# Patient Record
Sex: FEMALE | Race: WHITE | Employment: FULL TIME | URBAN - METROPOLITAN AREA
[De-identification: names, ages, dates, MRNs, and addresses within clinical notes are randomized per-mention and may not be internally consistent; named-entity substitution may affect disease eponyms.]

---

## 2021-04-29 ENCOUNTER — OFFICE VISIT (OUTPATIENT)
Dept: BARIATRICS/WEIGHT MGMT | Age: 31
End: 2021-04-29
Payer: MEDICARE

## 2021-04-29 VITALS
WEIGHT: 293 LBS | DIASTOLIC BLOOD PRESSURE: 86 MMHG | HEIGHT: 66 IN | HEART RATE: 76 BPM | BODY MASS INDEX: 47.09 KG/M2 | SYSTOLIC BLOOD PRESSURE: 114 MMHG

## 2021-04-29 DIAGNOSIS — G47.33 OSA (OBSTRUCTIVE SLEEP APNEA): ICD-10-CM

## 2021-04-29 DIAGNOSIS — E66.01 MORBID OBESITY WITH BMI OF 45.0-49.9, ADULT (HCC): ICD-10-CM

## 2021-04-29 DIAGNOSIS — E28.2 PCOS (POLYCYSTIC OVARIAN SYNDROME): ICD-10-CM

## 2021-04-29 DIAGNOSIS — K21.9 GASTROESOPHAGEAL REFLUX DISEASE WITHOUT ESOPHAGITIS: Primary | ICD-10-CM

## 2021-04-29 PROCEDURE — 99214 OFFICE O/P EST MOD 30 MIN: CPT | Performed by: SURGERY

## 2021-04-29 PROCEDURE — 1036F TOBACCO NON-USER: CPT | Performed by: SURGERY

## 2021-04-29 PROCEDURE — G8427 DOCREV CUR MEDS BY ELIG CLIN: HCPCS | Performed by: SURGERY

## 2021-04-29 PROCEDURE — G8417 CALC BMI ABV UP PARAM F/U: HCPCS | Performed by: SURGERY

## 2021-04-29 RX ORDER — SUCRALFATE 1 G/1
TABLET ORAL
COMMUNITY
Start: 2021-04-01 | End: 2021-08-31

## 2021-04-29 RX ORDER — FLUOXETINE HYDROCHLORIDE 20 MG/1
CAPSULE ORAL
COMMUNITY
Start: 2021-01-22

## 2021-05-02 NOTE — PROGRESS NOTES
[facility] INVASIVE BARIATRIC SURG  [address] [place] [address] [address] [address]  Dept: [phone]    SURGICAL WEIGHT MANAGEMENT PROGRAM  PROGRESS NOTE INITIAL EVALUATION     Patient: Darrick Meol        Service Date: 4/29/2021      HPI:     Chief Complaint   Patient presents with    Bariatric, Initial Visit     discuss bypass /revision last saw in 2016    Weight Loss       The patient is a pleasant 32y.o. year old female  with morbid obesity, who stands Height: 5' 6\" (167.6 cm) tall with a weight of Weight: 297 lb (134.7 kg) , resulting in a BMI of Body mass index is 47.94 kg/m². . The patient suffers from multiple co-morbidities as a result of morbid obesity, including: Asthma, Obstructive Sleep Apnea treated with BiPAP/CPAP, GERD and Polycystic Ovarian Syndrome. She has suffered from obesity for many years. She had prior sleeve with Dr. Ruba Macedo and has had weight regain and GERD. She would like to consider a revision    The patient denies  a history of myocardial infarction, deep vein thrombosis, pulmonary embolism, renal failure, hepatic failure and stroke. The patient has failed multiple attempts at non-surgical weight loss, and is now seeking surgical intervention to promote permanent and consistent weight loss. She  has chosen Roderick-en-Y Gastric Bypass and Revisional Surgery. She is well educated regarding it, as she has recently viewed our weight loss surgery informational seminar .      Medical History:  Past Medical History:   Diagnosis Date    Cholelithiasis     GERD (gastroesophageal reflux disease)     History of asthma     sports induced    Migraine     Obesity     PCOS (polycystic ovarian syndrome)     PONV (postoperative nausea and vomiting)     S/P cholecystectomy 2-29-16    [facility], Dr. Andressa Joyce S/P laparoscopic sleeve gastrectomy 1-5-15    start wt = 308.6lb, Dr. Ruba Macedo, [facility]    Sleep apnea     cpap       Surgical History:  Past medications for this visit. No Known Allergies    SOCIAL:      This patient is alone for the evaluation today. [] HIV Risk Factors (i.e.) intravenous drug abuser; at risk sexual behavior; received blood products    [] TB Risk Factors (i.e.) Medically underserved, institutional care, foreign born, endemic area; exposure to active case    [] Hepatitis B&C Risk Factors (i.e.) Received blood transfusion prior to 1992; recreational drug use; high risk sexual behaviors; tattoos or body piercings; contact with blood or needle sticks in the workplace    Comprehension    Ability to grasp concepts and respond to questions:   [x] High   [] Medium   [] Low    Motivation    [x] Asks Questions; eager to learn   [] Needs education   [] Extreme anxiety    [] uncooperative   [] Denies need for education    English Speaking Ability    [x] Speaks English well   [x] Reads English well   [] Understands spoken Vane Pump    [x] Understands written English   [x] No need for interpretive support      [] Might benefit from interpretive support   []  required for all services     REVIEW OF SYSTEMS: (Negative unless marked otherwise)       Do you or have you had any of the following?   Cardiovascular YES NO Respiratory YES NO   High Blood Pressure   []   [] COPD   []   []   Heart Attack   []   [] TB/Positive skin Test   []   []   Congestive Heart Failure   []   [] Obstructive Sleep Apnea   []   []   Coronary Artery Disease   []   [] Asthma   []   []   Circulation Problems   []   []      Activity Intolerance   []   [] Gastrointestinal YES NO   Peripheral Vascular Disease   []   [] Gastric Problems   []   []        Colorectal problems   []   []   Hematological YES NO Ulcer disease   []   []   Bleeding Tendencies   []   [] Liver disease   []   []   Blood Transfusion last 30d   []   [] Gallstones   [x]   []   Anemia   []   [] Refulx or Heartburn   [x]   []   Blood Clots   []   []      High Cholesterol   []   [] Muscoloskeletal YES NO   High Triglycerides   []   [] Joint Limitations   []   []      Muscle Weakness   []   []   Eyes, Ears, Nose, Throat YES NO Multiple Sclerosis   []   []   Cataracts   []   [] Arthritis   []   []   Glasses   []   []      Blurred Vision   []   [] Cancer   []   []   Hearing Aids   []   [] Type:     Ringing in Ears   []   []      Difficulty Swallowing   []   [] Encodrine YES NO      Diabetes   []   []   Neurological YES NO Thyroid   []   []   Stroke   []   []      Seizure   []   [] Psychiatric Disorder YES NO   Dizziness/Blackouts/Fainting   []   [] Depression   []   []   Memory Impairement   []   [] Bipolar   []   []   Parkinson's   []   [] Anxiety disorder   []   []           Genitourinary/Gyn YES NO Skin Intact   [x]   []   Urinary Infection   []   []      Stones   []   [] Sleep   YES NO   Kidney Disease   []   [] Excessive daytime sleepiness   []   []   Incontinent   []   [] Snoring   []   []   Irregular menstrual cycles   []   [] Unrefreshed sleep   []   []   Possibly Pregnant? []     [] Other:      Date of LMP:   Preferred location:            PRESENT ILLNESS:     Weight Parameters  Weight 297 lb (134.7 kg)   Height 5' 6\" (1.676 m)   BMI Body mass index is 47.94 kg/m². IBW     EBW                 FALLS ASSESSMENT    [x] LOW RISK FOR FALLS    [x] MODERATE RISK FOR FALLS    [] Difficulty walking/selfcare    [] Falls in the past 2 months    [] Suspicion of Clinician    [] Other:      SMOKING CESSATION     [x] Not needed     [] Instructed to stop smoking    [] Pamphlet community resources given     VTE SCREEN    [] Family hx DVT/PE  /   [] Personal hx of DVT/PE    [x] Denies any family or personal hx of DVT/PE    Physician Review    [x] Past medical, family, & social history reviewed and discussed with patient.      Review of surgery and post-surgical changes (by surgeon for surgical patients only)    [x] Lifelong diet expectations reviewed with patient    [x] Need for lifelong vitamin supplementation vitamin supplementation, as well as routine scheduled and dedicated exercise. I instructed the patient to utilize the exercise log that will be given to them at their fist dietician appointment. We discussed the potential weight loss benefit of approximately 60-70% of her excess body weight at 12-18 months post-op, as well as the possibility of insufficient weight loss or weight gain after 2 years post-operative time    PLAN:       Diagnosis Orders   1. Gastroesophageal reflux disease without esophagitis     2. PCOS (polycystic ovarian syndrome)     3. KATELYNN (obstructive sleep apnea)     4. Morbid obesity with BMI of 45.0-49.9, adult [facility])            Initial Testing     Primary Procedure: Roderick-en-Y Gastric Bypass and Revisional Surgery     Other Procedures:None    Labwork: Initial Pre-surgical Lab Tests (CMP, TSH, Fasting Lipid Profile, Mg, Zinc, Vit B1 (whole blood), Vit B12, 25-OH Vit D, Fe,  Ferritin,  Folate) and Negative serum nicotine prior to submission for pre-auth    Imaging: None    Endoscopic Studies: Upper GI Endoscopy for GERD which has been untreated. Need records    Psychological Assessment: Psychological Evaluation and Clearance to rule out eating disorder    Nutrition Assessment: Bariatric Nutrition Assessment and Clearance    Other  Consultations: medical clearance    Physician Supervised Diet and Exercise required by the patients insurance company: 3 months.       Surgical Diet requirement:  2 weeks    Final Testing  Screening Chest Xray  and EKG within 6 months of date of surgery    Labwork:  Final Lab Tests  within 3 months of date of surgery (CBC, PT/PTT, BMP)     Electronically signed by Ron Chaves DO on 5/2/2021 at 7:30 PM

## 2021-05-12 DIAGNOSIS — K21.9 GASTROESOPHAGEAL REFLUX DISEASE WITHOUT ESOPHAGITIS: Primary | ICD-10-CM

## 2021-05-20 ENCOUNTER — OFFICE VISIT (OUTPATIENT)
Dept: BARIATRICS/WEIGHT MGMT | Age: 31
End: 2021-05-20
Payer: MEDICARE

## 2021-05-20 VITALS
HEART RATE: 82 BPM | SYSTOLIC BLOOD PRESSURE: 110 MMHG | DIASTOLIC BLOOD PRESSURE: 78 MMHG | HEIGHT: 66 IN | BODY MASS INDEX: 47.09 KG/M2 | WEIGHT: 293 LBS

## 2021-05-20 DIAGNOSIS — K21.9 GASTROESOPHAGEAL REFLUX DISEASE, UNSPECIFIED WHETHER ESOPHAGITIS PRESENT: ICD-10-CM

## 2021-05-20 DIAGNOSIS — Z98.84 S/P LAPAROSCOPIC SLEEVE GASTRECTOMY: ICD-10-CM

## 2021-05-20 DIAGNOSIS — D50.9 IRON DEFICIENCY ANEMIA, UNSPECIFIED IRON DEFICIENCY ANEMIA TYPE: ICD-10-CM

## 2021-05-20 DIAGNOSIS — E28.2 PCOS (POLYCYSTIC OVARIAN SYNDROME): Primary | ICD-10-CM

## 2021-05-20 DIAGNOSIS — E66.01 MORBID OBESITY (HCC): ICD-10-CM

## 2021-05-20 PROCEDURE — G8417 CALC BMI ABV UP PARAM F/U: HCPCS | Performed by: NURSE PRACTITIONER

## 2021-05-20 PROCEDURE — 1036F TOBACCO NON-USER: CPT | Performed by: NURSE PRACTITIONER

## 2021-05-20 PROCEDURE — 99213 OFFICE O/P EST LOW 20 MIN: CPT | Performed by: NURSE PRACTITIONER

## 2021-05-20 PROCEDURE — G8427 DOCREV CUR MEDS BY ELIG CLIN: HCPCS | Performed by: NURSE PRACTITIONER

## 2021-05-20 NOTE — PROGRESS NOTES
Medical Weight Management Progress Note    Subjective      Patient being seen for medically supervised weight loss for the chronic conditions of PCOS, BLANCO, GERD. She had prior sleeve gastrectomy with Dr. Kleber Hernandez in 2015 and has had weight regain and GERD. She would like to consider a revision  She is working on the behavior changes discussed at the initial appointment. Patient continues on diet plan. Physical activity includes walking. Weight loss since last visit is 0 lbs. Psych eval scheduled 6/24/21. EGD done at [facility] 4/15/21 reviewed per Dr Kleber Vazquez. No path report available. Requests H Pylori breath test.  Patient taking protonix and carafate. No current issues. Working toward bariatric surgery:    [] Sleeve Gastrectomy                                                           [] Roderick-en-Y Gastric Bypass                [x] Revision of Sleeve to Gastric Bypass    Allergies:  No Known Allergies    Past Medical History:     Past Medical History:   Diagnosis Date    Cholelithiasis     GERD (gastroesophageal reflux disease)     History of asthma     sports induced    Migraine     Obesity     PCOS (polycystic ovarian syndrome)     PONV (postoperative nausea and vomiting)     S/P cholecystectomy 2-29-16    [facility], Dr. Susanna Figueroa S/P laparoscopic sleeve gastrectomy 1-5-15    start wt = 308.6lb, Dr. Sanford Seek    Sleep apnea     cpap   . Past Surgical History:  Past Surgical History:   Procedure Laterality Date    CHOLECYSTECTOMY, LAPAROSCOPIC  02/29/16    X I robotic assisted    GASTRECTOMY  1/5/15    sleeve gastrectomy    HERNIA REPAIR      with sleeve surgery.         Family History:  Family History   Problem Relation Age of Onset    Substance Abuse Mother     Depression Father     Diabetes Father     High Cholesterol Father     Stroke Father     Arthritis Maternal Grandmother     Prostate Cancer Maternal Grandfather     Stroke Paternal Grandmother     Diabetes Take 1 tablet by mouth 2 times daily 60 tablet 3    CALCIUM CITRATE, BARIATRIC ADVANTAGE, 500MG LOZENGE Take 1 lozenge by mouth 3 times daily.  Multiple Vitamin (MVI, CELEBRATE, CHEWABLE TABLET) Take 1 tablet by mouth 2 times daily.  cholestyramine (QUESTRAN) 4 G packet Take 1 packet by mouth 2 times daily (Patient not taking: Reported on 4/29/2021) 90 packet 3    medroxyPROGESTERone (DEPO-PROVERA) 150 MG/ML injection Inject 150 mg into the muscle every 3 months (Patient not taking: Reported on 5/20/2021)  0    vitamin D (ERGOCALCIFEROL) 18142 UNITS CAPS capsule Take 50,000 Units by mouth once a week.  vitamin D (ERGOCALCIFEROL) 35961 UNITS CAPS capsule Take 1 capsule by mouth once a week for 8 doses. 8 capsule 0     No current facility-administered medications for this visit. Vital Signs:  /78 (Site: Right Upper Arm, Position: Sitting, Cuff Size: Large Adult)   Pulse 82   Ht 5' 6\" (1.676 m)   Wt 297 lb (134.7 kg)   BMI 47.94 kg/m²     BMI/Height/Weight:  Body mass index is 47.94 kg/m². Review of Systems - A review of systems was performed. All was negative unless otherwise documented in HPI. Constitutional: Negative for fever, chills and diaphoresis. HENT: Negative for hearing loss and trouble swallowing. Eyes: Negative for photophobia and visual disturbance. Respiratory: Negative for cough, shortness of breath and wheezing. Cardiovascular: Negative for chest pain and palpitations. Gastrointestinal: Negative for nausea, vomiting, abdominal pain, diarrhea, constipation, blood in stool and abdominal distention. Endocrine: Negative for polydipsia, polyphagia and polyuria. Genitourinary: Negative for dysuria, frequency, hematuria and difficulty urinating. Musculoskeletal: Negative for myalgias, joint swelling. Skin: Negative for pallor and rash. Neurological: Negative for dizziness, tremors, light-headedness and headaches.    Psychiatric/Behavioral: Negative for sleep disturbance and dysphoric mood. Objective:      Physical Exam   Vital signs reviewed. General: Well-developed and well-nourished. No acute distress. Skin: Warm, dry and intact. HEENT: Normocephalic. EOMs intact. Conjunctivae normal. Neck supple. Cardiovascular: Normal rate, regular rhythm. Pulmonary/Chest: Normal effort. Lungs clear to auscultation. No rales, rhonchi or wheezing. Abdominal: Positive bowel sounds. Soft, nontender. Nondistended. Musculoskeletal: Movement x4. No edema. Neurological: Gait normal. Alert and oriented to person, place, and time. Psychiatric: Normal mood and affect. Speech and behavior normal. Judgment and thought content normal. Cognition and memory intact. Assessment:       Diagnosis Orders   1. PCOS (polycystic ovarian syndrome)  CBC Auto Differential    Hemoglobin A1C    Comprehensive Metabolic Panel    Iron and TIBC    Lipid Panel    Magnesium    PTH, Intact    TSH without Reflex    Urine Drug Screen    Vitamin A    Vitamin B1    Vitamin D 25 Hydroxy    Vitamin B12 & Folate    Zinc    H. Pylori Breath Test   2. S/P laparoscopic sleeve gastrectomy  CBC Auto Differential    Hemoglobin A1C    Comprehensive Metabolic Panel    Iron and TIBC    Lipid Panel    Magnesium    PTH, Intact    TSH without Reflex    Urine Drug Screen    Vitamin A    Vitamin B1    Vitamin D 25 Hydroxy    Vitamin B12 & Folate    Zinc    H. Pylori Breath Test   3. Iron deficiency anemia, unspecified iron deficiency anemia type  CBC Auto Differential    Hemoglobin A1C    Comprehensive Metabolic Panel    Iron and TIBC    Lipid Panel    Magnesium    PTH, Intact    TSH without Reflex    Urine Drug Screen    Vitamin A    Vitamin B1    Vitamin D 25 Hydroxy    Vitamin B12 & Folate    Zinc    H. Pylori Breath Test   4.  Gastroesophageal reflux disease, unspecified whether esophagitis present  CBC Auto Differential    Hemoglobin A1C    Comprehensive Metabolic Panel    Iron and TIBC    Lipid Panel    Magnesium    PTH, Intact    TSH without Reflex    Urine Drug Screen    Vitamin A    Vitamin B1    Vitamin D 25 Hydroxy    Vitamin B12 & Folate    Zinc    H. Pylori Breath Test   5. Morbid obesity (HCC)  CBC Auto Differential    Hemoglobin A1C    Comprehensive Metabolic Panel    Iron and TIBC    Lipid Panel    Magnesium    PTH, Intact    TSH without Reflex    Urine Drug Screen    Vitamin A    Vitamin B1    Vitamin D 25 Hydroxy    Vitamin B12 & Folate    Zinc    H. Pylori Breath Test       Plan:    Dietitian visit today. Patient was encouraged to journal all food intake. Keep calorie level at approximately 2195-0471. Protein intake is to be a minimum of 60-80 grams per day. Water drinking was encouraged with a goal of 64oz-128oz daily. Beverages to be calorie free except for milk. Every other beverage should be water. Avoid soda. Continue to increase level of physical activity. Encouraged use of exercise log. Bariatric labs ordered. H. Pylori breath test ordered. Follow-up  Return in about 1 month (around 6/20/2021). Orders this encounter:  Orders Placed This Encounter   Procedures    CBC Auto Differential     Standing Status:   Future     Standing Expiration Date:   5/20/2022    Hemoglobin A1C     Standing Status:   Future     Standing Expiration Date:   5/20/2022    Comprehensive Metabolic Panel     Standing Status:   Future     Standing Expiration Date:   5/20/2022    Iron and TIBC     Standing Status:   Future     Standing Expiration Date:   5/20/2022     Order Specific Question:   Is Patient Fasting? Answer:   yes     Order Specific Question:   No of Hours?      Answer:   12    Lipid Panel     Standing Status:   Future     Standing Expiration Date:   5/20/2022     Order Specific Question:   Is Patient Fasting?/# of Hours     Answer:   15    Magnesium     Standing Status:   Future     Standing Expiration Date:   5/20/2022    PTH, Intact     Standing Status:   Future     Standing Expiration Date:   5/20/2022    TSH without Reflex     Standing Status:   Future     Standing Expiration Date:   5/20/2022    Urine Drug Screen     Standing Status:   Future     Standing Expiration Date:   5/20/2022    Vitamin A     Standing Status:   Future     Standing Expiration Date:   5/20/2022    Vitamin B1     Standing Status:   Future     Standing Expiration Date:   5/20/2022    Vitamin D 25 Hydroxy     Standing Status:   Future     Standing Expiration Date:   5/20/2022    Vitamin B12 & Folate     Standing Status:   Future     Standing Expiration Date:   5/20/2022    Zinc     Standing Status:   Future     Standing Expiration Date:   5/20/2022    H. Pylori Breath Test     Standing Status:   Future     Standing Expiration Date:   5/20/2022       Prescriptions this encounter:  No orders of the defined types were placed in this encounter.       Electronically signed by:  Kelvin Hudson CNP

## 2021-06-10 ENCOUNTER — HOSPITAL ENCOUNTER (OUTPATIENT)
Age: 31
Discharge: HOME | End: 2021-06-10
Payer: MEDICARE

## 2021-06-10 DIAGNOSIS — D50.9 IRON DEFICIENCY ANEMIA, UNSPECIFIED IRON DEFICIENCY ANEMIA TYPE: ICD-10-CM

## 2021-06-10 DIAGNOSIS — E28.2 PCOS (POLYCYSTIC OVARIAN SYNDROME): ICD-10-CM

## 2021-06-10 DIAGNOSIS — K21.9 GASTROESOPHAGEAL REFLUX DISEASE, UNSPECIFIED WHETHER ESOPHAGITIS PRESENT: ICD-10-CM

## 2021-06-10 DIAGNOSIS — E66.01 MORBID OBESITY (HCC): ICD-10-CM

## 2021-06-10 DIAGNOSIS — Z98.84 S/P LAPAROSCOPIC SLEEVE GASTRECTOMY: ICD-10-CM

## 2021-06-10 DIAGNOSIS — E61.1 LOW IRON: ICD-10-CM

## 2021-06-10 DIAGNOSIS — E55.9 VITAMIN D DEFICIENCY: Primary | ICD-10-CM

## 2021-06-10 LAB
ABSOLUTE EOS #: 0.14 K/UL (ref 0–0.4)
ABSOLUTE IMMATURE GRANULOCYTE: ABNORMAL K/UL (ref 0–0.3)
ABSOLUTE LYMPH #: 1.63 K/UL (ref 1–4.8)
ABSOLUTE MONO #: 0.34 K/UL (ref 0.1–1.3)
ALBUMIN SERPL-MCNC: 4.4 G/DL (ref 3.5–5.2)
ALBUMIN/GLOBULIN RATIO: ABNORMAL (ref 1–2.5)
ALP BLD-CCNC: 109 U/L (ref 35–104)
ALT SERPL-CCNC: 19 U/L (ref 5–33)
AMPHETAMINE SCREEN URINE: NEGATIVE
ANION GAP SERPL CALCULATED.3IONS-SCNC: 9 MMOL/L (ref 9–17)
AST SERPL-CCNC: 24 U/L
BARBITURATE SCREEN URINE: NEGATIVE
BASOPHILS # BLD: 0 % (ref 0–2)
BASOPHILS ABSOLUTE: 0 K/UL (ref 0–0.2)
BENZODIAZEPINE SCREEN, URINE: NEGATIVE
BILIRUB SERPL-MCNC: 0.29 MG/DL (ref 0.3–1.2)
BUN BLDV-MCNC: 14 MG/DL (ref 6–20)
BUN/CREAT BLD: ABNORMAL (ref 9–20)
BUPRENORPHINE URINE: NORMAL
CALCIUM SERPL-MCNC: 9 MG/DL (ref 8.6–10.4)
CANNABINOID SCREEN URINE: NEGATIVE
CHLORIDE BLD-SCNC: 102 MMOL/L (ref 98–107)
CHOLESTEROL/HDL RATIO: 4.1
CHOLESTEROL: 160 MG/DL
CO2: 29 MMOL/L (ref 20–31)
COCAINE METABOLITE, URINE: NEGATIVE
CREAT SERPL-MCNC: 0.75 MG/DL (ref 0.5–0.9)
DIFFERENTIAL TYPE: ABNORMAL
EOSINOPHILS RELATIVE PERCENT: 2 % (ref 0–4)
ESTIMATED AVERAGE GLUCOSE: 108 MG/DL
FOLATE: 9.9 NG/ML
GFR AFRICAN AMERICAN: >60 ML/MIN
GFR NON-AFRICAN AMERICAN: >60 ML/MIN
GFR SERPL CREATININE-BSD FRML MDRD: ABNORMAL ML/MIN/{1.73_M2}
GFR SERPL CREATININE-BSD FRML MDRD: ABNORMAL ML/MIN/{1.73_M2}
GLUCOSE BLD-MCNC: 94 MG/DL (ref 70–99)
HBA1C MFR BLD: 5.4 % (ref 4–6)
HCT VFR BLD CALC: 36.7 % (ref 36–46)
HDLC SERPL-MCNC: 39 MG/DL
HEMOGLOBIN: 11.8 G/DL (ref 12–16)
IMMATURE GRANULOCYTES: ABNORMAL %
IRON SATURATION: 8 % (ref 20–55)
IRON: 33 UG/DL (ref 37–145)
LDL CHOLESTEROL: 92 MG/DL (ref 0–130)
LYMPHOCYTES # BLD: 24 % (ref 24–44)
MAGNESIUM: 1.9 MG/DL (ref 1.6–2.6)
MCH RBC QN AUTO: 24.1 PG (ref 26–34)
MCHC RBC AUTO-ENTMCNC: 32.1 G/DL (ref 31–37)
MCV RBC AUTO: 75.2 FL (ref 80–100)
MDMA URINE: NORMAL
METHADONE SCREEN, URINE: NEGATIVE
METHAMPHETAMINE, URINE: NORMAL
MONOCYTES # BLD: 5 % (ref 1–7)
MORPHOLOGY: ABNORMAL
NRBC AUTOMATED: ABNORMAL PER 100 WBC
OPIATES, URINE: NEGATIVE
OXYCODONE SCREEN URINE: NEGATIVE
PDW BLD-RTO: 15.9 % (ref 11.5–14.9)
PHENCYCLIDINE, URINE: NEGATIVE
PLATELET # BLD: 263 K/UL (ref 150–450)
PLATELET ESTIMATE: ABNORMAL
PMV BLD AUTO: 9.1 FL (ref 6–12)
POTASSIUM SERPL-SCNC: 3.9 MMOL/L (ref 3.7–5.3)
PROPOXYPHENE, URINE: NORMAL
PTH INTACT: 28.91 PG/ML (ref 15–65)
RBC # BLD: 4.87 M/UL (ref 4–5.2)
RBC # BLD: ABNORMAL 10*6/UL
SEG NEUTROPHILS: 69 % (ref 36–66)
SEGMENTED NEUTROPHILS ABSOLUTE COUNT: 4.69 K/UL (ref 1.3–9.1)
SODIUM BLD-SCNC: 140 MMOL/L (ref 135–144)
TEST INFORMATION: NORMAL
TOTAL IRON BINDING CAPACITY: 396 UG/DL (ref 250–450)
TOTAL PROTEIN: 7.6 G/DL (ref 6.4–8.3)
TRICYCLIC ANTIDEPRESSANTS, UR: NORMAL
TRIGL SERPL-MCNC: 146 MG/DL
TSH SERPL DL<=0.05 MIU/L-ACNC: 2.52 MIU/L (ref 0.3–5)
UNSATURATED IRON BINDING CAPACITY: 363 UG/DL (ref 112–347)
VITAMIN B-12: 429 PG/ML (ref 232–1245)
VITAMIN D 25-HYDROXY: 25.9 NG/ML (ref 30–100)
VLDLC SERPL CALC-MCNC: ABNORMAL MG/DL (ref 1–30)
WBC # BLD: 6.8 K/UL (ref 3.5–11)
WBC # BLD: ABNORMAL 10*3/UL

## 2021-06-10 PROCEDURE — 83735 ASSAY OF MAGNESIUM: CPT

## 2021-06-10 PROCEDURE — 83014 H PYLORI DRUG ADMIN: CPT

## 2021-06-10 PROCEDURE — 82306 VITAMIN D 25 HYDROXY: CPT

## 2021-06-10 PROCEDURE — 83550 IRON BINDING TEST: CPT

## 2021-06-10 PROCEDURE — 84590 ASSAY OF VITAMIN A: CPT

## 2021-06-10 PROCEDURE — 84443 ASSAY THYROID STIM HORMONE: CPT

## 2021-06-10 PROCEDURE — 36415 COLL VENOUS BLD VENIPUNCTURE: CPT

## 2021-06-10 PROCEDURE — 83013 H PYLORI (C-13) BREATH: CPT

## 2021-06-10 PROCEDURE — 83540 ASSAY OF IRON: CPT

## 2021-06-10 PROCEDURE — 80053 COMPREHEN METABOLIC PANEL: CPT

## 2021-06-10 PROCEDURE — 82746 ASSAY OF FOLIC ACID SERUM: CPT

## 2021-06-10 PROCEDURE — 83970 ASSAY OF PARATHORMONE: CPT

## 2021-06-10 PROCEDURE — 80307 DRUG TEST PRSMV CHEM ANLYZR: CPT

## 2021-06-10 PROCEDURE — 83036 HEMOGLOBIN GLYCOSYLATED A1C: CPT

## 2021-06-10 PROCEDURE — 82607 VITAMIN B-12: CPT

## 2021-06-10 PROCEDURE — 84630 ASSAY OF ZINC: CPT

## 2021-06-10 PROCEDURE — 80061 LIPID PANEL: CPT

## 2021-06-10 PROCEDURE — 85025 COMPLETE CBC W/AUTO DIFF WBC: CPT

## 2021-06-10 PROCEDURE — 84425 ASSAY OF VITAMIN B-1: CPT

## 2021-06-10 RX ORDER — FERROUS SULFATE 325(65) MG
325 TABLET ORAL
Qty: 90 TABLET | Refills: 1 | Status: ON HOLD | OUTPATIENT
Start: 2021-06-10 | End: 2021-09-17 | Stop reason: HOSPADM

## 2021-06-10 RX ORDER — ERGOCALCIFEROL 1.25 MG/1
50000 CAPSULE ORAL WEEKLY
Qty: 8 CAPSULE | Refills: 0 | Status: SHIPPED | OUTPATIENT
Start: 2021-06-10 | End: 2021-08-04

## 2021-06-12 LAB — H PYLORI BREATH TEST: NEGATIVE

## 2021-06-13 LAB
RETINYL PALMITATE: <0.02 MG/L (ref 0–0.1)
VITAMIN A LEVEL: 0.54 MG/L (ref 0.3–1.2)
VITAMIN A, INTERP: NORMAL

## 2021-06-14 LAB — ZINC: 75.4 UG/DL (ref 60–120)

## 2021-06-17 LAB — VITAMIN B1 WHOLE BLOOD: 141 NMOL/L (ref 70–180)

## 2021-06-24 ENCOUNTER — OFFICE VISIT (OUTPATIENT)
Dept: BARIATRICS/WEIGHT MGMT | Age: 31
End: 2021-06-24
Payer: MEDICARE

## 2021-06-24 VITALS
DIASTOLIC BLOOD PRESSURE: 80 MMHG | SYSTOLIC BLOOD PRESSURE: 106 MMHG | BODY MASS INDEX: 47.09 KG/M2 | HEART RATE: 76 BPM | WEIGHT: 293 LBS | HEIGHT: 66 IN

## 2021-06-24 DIAGNOSIS — K21.9 GASTROESOPHAGEAL REFLUX DISEASE, UNSPECIFIED WHETHER ESOPHAGITIS PRESENT: ICD-10-CM

## 2021-06-24 DIAGNOSIS — E66.01 MORBID OBESITY (HCC): ICD-10-CM

## 2021-06-24 DIAGNOSIS — D50.9 IRON DEFICIENCY ANEMIA, UNSPECIFIED IRON DEFICIENCY ANEMIA TYPE: ICD-10-CM

## 2021-06-24 DIAGNOSIS — E28.2 PCOS (POLYCYSTIC OVARIAN SYNDROME): Primary | ICD-10-CM

## 2021-06-24 DIAGNOSIS — Z98.84 S/P LAPAROSCOPIC SLEEVE GASTRECTOMY: ICD-10-CM

## 2021-06-24 PROCEDURE — 99213 OFFICE O/P EST LOW 20 MIN: CPT | Performed by: NURSE PRACTITIONER

## 2021-06-24 PROCEDURE — G8417 CALC BMI ABV UP PARAM F/U: HCPCS | Performed by: NURSE PRACTITIONER

## 2021-06-24 PROCEDURE — G8427 DOCREV CUR MEDS BY ELIG CLIN: HCPCS | Performed by: NURSE PRACTITIONER

## 2021-06-24 PROCEDURE — 1036F TOBACCO NON-USER: CPT | Performed by: NURSE PRACTITIONER

## 2021-06-24 NOTE — PROGRESS NOTES
Medical Weight Management Progress Note    Subjective      Patient being seen for medically supervised weight loss for the chronic conditions of PCOS, BLANCO, GERD. She had prior sleeve gastrectomy with Dr. Dorita Treviño in 2015 and has had weight regain and GERD. She would like to consider a revision  She is working on the behavior changes discussed at the initial appointment. Patient continues on diet plan. Physical activity includes walking. Weight gain of 1 lb since last visit. Psych eval scheduled 6/24/21. EGD done at [facility] 4/15/21 reviewed per Dr Burns. H Pylori follow-up breath test was negative. Patient taking protonix and carafate. No current issues. Working toward bariatric surgery:    [] Sleeve Gastrectomy                                                           [] Roderick-en-Y Gastric Bypass                [x] Revision of Sleeve to Gastric Bypass    Allergies:  No Known Allergies    Past Medical History:     Past Medical History:   Diagnosis Date    Cholelithiasis     GERD (gastroesophageal reflux disease)     History of asthma     sports induced    Migraine     Obesity     PCOS (polycystic ovarian syndrome)     PONV (postoperative nausea and vomiting)     S/P cholecystectomy 2-29-16    [facility], Dr. Lit De La Rosa S/P laparoscopic sleeve gastrectomy 1-5-15    start wt = 308.6lb, Dr. Mik Stroud    Sleep apnea     cpap   . Past Surgical History:  Past Surgical History:   Procedure Laterality Date    CHOLECYSTECTOMY, LAPAROSCOPIC  02/29/16    X I robotic assisted    GASTRECTOMY  1/5/15    sleeve gastrectomy    HERNIA REPAIR      with sleeve surgery.         Family History:  Family History   Problem Relation Age of Onset    Substance Abuse Mother     Depression Father     Diabetes Father     High Cholesterol Father     Stroke Father     Arthritis Maternal Grandmother     Prostate Cancer Maternal Grandfather     Stroke Paternal Grandmother     Diabetes Paternal Grandfather     Heart Disease Paternal Grandfather     Heart Attack Father     High Blood Pressure Father        Social History:  Social History     Socioeconomic History    Marital status: Single     Spouse name: Not on file    Number of children: 0    Years of education: Not on file    Highest education level: Not on file   Occupational History    Occupation: [employer]   Tobacco Use    Smoking status: Passive Smoke Exposure - Never Smoker    Smokeless tobacco: Never Used   Substance and Sexual Activity    Alcohol use: Yes     Comment: social, willing to avoid for at least 6 mon post tania surg    Drug use: No    Sexual activity: Yes     Partners: Male   Other Topics Concern    Not on file   Social History Narrative    Not on file     Social Determinants of Health     Financial Resource Strain:     Difficulty of Paying Living Expenses:    Food Insecurity:     Worried About Running Out of Food in the Last Year:     Ran Out of Food in the Last Year:    Transportation Needs:     Lack of Transportation (Medical):      Lack of Transportation (Non-Medical):    Physical Activity:     Days of Exercise per Week:     Minutes of Exercise per Session:    Stress:     Feeling of Stress :    Social Connections:     Frequency of Communication with Friends and Family:     Frequency of Social Gatherings with Friends and Family:     Attends Judaism Services:     Active Member of Clubs or Organizations:     Attends Club or Organization Meetings:     Marital Status:    Intimate Partner Violence:     Fear of Current or Ex-Partner:     Emotionally Abused:     Physically Abused:     Sexually Abused:        Current Medications:  Current Outpatient Medications   Medication Sig Dispense Refill    ferrous sulfate (IRON 325) 325 (65 Fe) MG tablet Take 1 tablet by mouth daily (with breakfast) 90 tablet 1    vitamin D (ERGOCALCIFEROL) 1.25 MG (06473 UT) CAPS capsule Take 1 capsule by mouth once a week for 8 doses 8 capsule 0    FLUoxetine (PROZAC) 20 MG capsule take 1 capsule by mouth once daily      sucralfate (CARAFATE) 1 GM tablet take 1 tablet by mouth ON AN EMPTY STOMACH four times a day      pantoprazole (PROTONIX) 40 MG tablet Take 1 tablet by mouth 2 times daily 60 tablet 3    Multiple Vitamin (MVI, CELEBRATE, CHEWABLE TABLET) Take 1 tablet by mouth 2 times daily.  cholestyramine (QUESTRAN) 4 G packet Take 1 packet by mouth 2 times daily (Patient not taking: Reported on 4/29/2021) 90 packet 3    medroxyPROGESTERone (DEPO-PROVERA) 150 MG/ML injection Inject 150 mg into the muscle every 3 months (Patient not taking: Reported on 5/20/2021)  0    CALCIUM CITRATE, BARIATRIC ADVANTAGE, 500MG LOZENGE Take 1 lozenge by mouth 3 times daily. (Patient not taking: Reported on 6/24/2021)       No current facility-administered medications for this visit. Vital Signs:  /80 (Site: Right Upper Arm, Position: Sitting, Cuff Size: Large Adult)   Pulse 76   Ht 5' 6\" (1.676 m)   Wt 298 lb (135.2 kg)   BMI 48.10 kg/m²     BMI/Height/Weight:  Body mass index is 48.1 kg/m². Review of Systems - A review of systems was performed. All was negative unless otherwise documented in HPI. Constitutional: Negative for fever, chills and diaphoresis. HENT: Negative for hearing loss and trouble swallowing. Eyes: Negative for photophobia and visual disturbance. Respiratory: Negative for cough, shortness of breath and wheezing. Cardiovascular: Negative for chest pain and palpitations. Gastrointestinal: Negative for nausea, vomiting, abdominal pain, diarrhea, constipation, blood in stool and abdominal distention. Endocrine: Negative for polydipsia, polyphagia and polyuria. Genitourinary: Negative for dysuria, frequency, hematuria and difficulty urinating. Musculoskeletal: Negative for myalgias, joint swelling. Skin: Negative for pallor and rash.    Neurological: Negative for dizziness, tremors, light-headedness and headaches. Psychiatric/Behavioral: Negative for sleep disturbance and dysphoric mood. Objective:      Physical Exam   Vital signs reviewed. General: Well-developed and well-nourished. No acute distress. Skin: Warm, dry and intact. HEENT: Normocephalic. EOMs intact. Conjunctivae normal. Neck supple. Cardiovascular: Normal rate, regular rhythm. Pulmonary/Chest: Normal effort. Lungs clear to auscultation. No rales, rhonchi or wheezing. Abdominal: Positive bowel sounds. Soft, nontender. Nondistended. Musculoskeletal: Movement x4. No edema. Neurological: Gait normal. Alert and oriented to person, place, and time. Psychiatric: Normal mood and affect. Speech and behavior normal. Judgment and thought content normal. Cognition and memory intact. Assessment:       Diagnosis Orders   1. PCOS (polycystic ovarian syndrome)     2. S/P laparoscopic sleeve gastrectomy     3. Iron deficiency anemia, unspecified iron deficiency anemia type     4. Gastroesophageal reflux disease, unspecified whether esophagitis present     5. Morbid obesity ([address]         Plan:    Dietitian visit today. Patient was encouraged to journal all food intake. Keep calorie level at approximately 4407-1539. Protein intake is to be a minimum of 60-80 grams per day. Water drinking was encouraged with a goal of 64oz-128oz daily. Beverages to be calorie free except for milk. Every other beverage should be water. Avoid soda. Continue to increase level of physical activity. Encouraged use of exercise log. Labs reviewed and discussed with patient. Vitamin D and iron low and already e-prescribed. H. Pylori breath test negative. Follow-up  Return in about 1 month (around 7/24/2021). Orders this encounter:  No orders of the defined types were placed in this encounter. Prescriptions this encounter:  No orders of the defined types were placed in this encounter.       Electronically signed by:  Ofelia Sprague Buckbenito Corona, CNP

## 2021-06-24 NOTE — PROGRESS NOTES
Medical Nutrition Therapy   Metabolic and Bariatric Surgery         Supervised diet and exercise preparation  Visit 2 out of 3  Pt reports:  No concerns; pt purchased new binder    Changes in eating patterns to promote health are noted below on the goals number 22-25    Vitals: Wt Readings from Last 3 Encounters:   06/24/21 298 lb (135.2 kg)   05/20/21 297 lb (134.7 kg)   04/29/21 297 lb (134.7 kg)         Nutrition Assessment:   PES: Knowledge deficit related to healthy behaviors that support weight management post weight loss surgery as evidenced by Body mass index is 48.1 kg/m². Nutrition Assessment of Goal Attainment:  TREATMENT GOALS:    Continue following bariatric behaviors. Continue logging exercise. Read binder. Do you understand your goals? y    Do you have the information you need to achieve your goals? y    Do you have any questions  right now? n        [x]  Consistent goal achievement in the program thus far and further success with goals is expected. []  Unable to consistently make progress in goal achievement. At this time patient is not moving forward  in developing the skills needed for success after surgery. Plan:    Continue to follow monthly and review goals.          [x]  Nutrition visits complete    []

## 2021-07-22 ENCOUNTER — OFFICE VISIT (OUTPATIENT)
Dept: BARIATRICS/WEIGHT MGMT | Age: 31
End: 2021-07-22
Payer: MEDICARE

## 2021-07-22 VITALS
WEIGHT: 292 LBS | BODY MASS INDEX: 46.93 KG/M2 | DIASTOLIC BLOOD PRESSURE: 80 MMHG | HEART RATE: 60 BPM | SYSTOLIC BLOOD PRESSURE: 110 MMHG | HEIGHT: 66 IN

## 2021-07-22 DIAGNOSIS — E28.2 PCOS (POLYCYSTIC OVARIAN SYNDROME): ICD-10-CM

## 2021-07-22 DIAGNOSIS — E66.01 MORBID OBESITY (HCC): ICD-10-CM

## 2021-07-22 DIAGNOSIS — K21.9 GASTROESOPHAGEAL REFLUX DISEASE, UNSPECIFIED WHETHER ESOPHAGITIS PRESENT: Primary | ICD-10-CM

## 2021-07-22 DIAGNOSIS — D50.9 IRON DEFICIENCY ANEMIA, UNSPECIFIED IRON DEFICIENCY ANEMIA TYPE: ICD-10-CM

## 2021-07-22 DIAGNOSIS — Z98.84 S/P LAPAROSCOPIC SLEEVE GASTRECTOMY: ICD-10-CM

## 2021-07-22 PROCEDURE — 99213 OFFICE O/P EST LOW 20 MIN: CPT | Performed by: NURSE PRACTITIONER

## 2021-07-22 PROCEDURE — G8417 CALC BMI ABV UP PARAM F/U: HCPCS | Performed by: NURSE PRACTITIONER

## 2021-07-22 PROCEDURE — G8427 DOCREV CUR MEDS BY ELIG CLIN: HCPCS | Performed by: NURSE PRACTITIONER

## 2021-07-22 PROCEDURE — 1036F TOBACCO NON-USER: CPT | Performed by: NURSE PRACTITIONER

## 2021-07-22 NOTE — PROGRESS NOTES
Medical Weight Management Progress Note    Subjective      Patient being seen for medically supervised weight loss for the chronic conditions of PCOS, BLANCO, GERD. She had prior sleeve gastrectomy with Dr. Susie Vaca in 2015 and has had weight regain and GERD. She would like to consider a revision  She is working on the behavior changes discussed at the initial appointment. Patient continues on diet plan. Physical activity includes walking. Weight loss of 6 lbs since last visit. Psych eval completed and clearance received. EGD done at [facility] 4/15/21 reviewed per Dr Alona Holcomb. H Pylori follow-up breath test was negative. Patient taking protonix and carafate. No current issues. Working toward bariatric surgery:    [] Sleeve Gastrectomy                                                           [] Roderick-en-Y Gastric Bypass                [x] Revision of Sleeve to Gastric Bypass    Allergies:  No Known Allergies    Past Medical History:     Past Medical History:   Diagnosis Date    Cholelithiasis     GERD (gastroesophageal reflux disease)     History of asthma     sports induced    Migraine     Obesity     PCOS (polycystic ovarian syndrome)     PONV (postoperative nausea and vomiting)     S/P cholecystectomy 2-29-16    [facility], Dr. Maxi Church S/P laparoscopic sleeve gastrectomy 1-5-15    start wt = 308.6lb, Dr. Neha Luna    Sleep apnea     cpap   . Past Surgical History:  Past Surgical History:   Procedure Laterality Date    CHOLECYSTECTOMY, LAPAROSCOPIC  02/29/16    X I robotic assisted    GASTRECTOMY  1/5/15    sleeve gastrectomy    HERNIA REPAIR      with sleeve surgery.         Family History:  Family History   Problem Relation Age of Onset    Substance Abuse Mother     Depression Father     Diabetes Father     High Cholesterol Father     Stroke Father     Arthritis Maternal Grandmother     Prostate Cancer Maternal Grandfather     Stroke Paternal Grandmother     Diabetes Paternal Grandfather     Heart Disease Paternal Grandfather     Heart Attack Father     High Blood Pressure Father        Social History:  Social History     Socioeconomic History    Marital status: Single     Spouse name: Not on file    Number of children: 0    Years of education: Not on file    Highest education level: Not on file   Occupational History    Occupation: [employer]   Tobacco Use    Smoking status: Passive Smoke Exposure - Never Smoker    Smokeless tobacco: Never Used   Substance and Sexual Activity    Alcohol use: Yes     Comment: social, willing to avoid for at least 6 mon post tania surg    Drug use: No    Sexual activity: Yes     Partners: Male   Other Topics Concern    Not on file   Social History Narrative    Not on file     Social Determinants of Health     Financial Resource Strain:     Difficulty of Paying Living Expenses:    Food Insecurity:     Worried About Running Out of Food in the Last Year:     Ran Out of Food in the Last Year:    Transportation Needs:     Lack of Transportation (Medical):      Lack of Transportation (Non-Medical):    Physical Activity:     Days of Exercise per Week:     Minutes of Exercise per Session:    Stress:     Feeling of Stress :    Social Connections:     Frequency of Communication with Friends and Family:     Frequency of Social Gatherings with Friends and Family:     Attends Spiritism Services:     Active Member of Clubs or Organizations:     Attends Club or Organization Meetings:     Marital Status:    Intimate Partner Violence:     Fear of Current or Ex-Partner:     Emotionally Abused:     Physically Abused:     Sexually Abused:        Current Medications:  Current Outpatient Medications   Medication Sig Dispense Refill    ferrous sulfate (IRON 325) 325 (65 Fe) MG tablet Take 1 tablet by mouth daily (with breakfast) 90 tablet 1    vitamin D (ERGOCALCIFEROL) 1.25 MG (35058 UT) CAPS capsule Take 1 capsule by mouth once a week for 8 doses 8 capsule 0    FLUoxetine (PROZAC) 20 MG capsule take 1 capsule by mouth once daily      sucralfate (CARAFATE) 1 GM tablet take 1 tablet by mouth ON AN EMPTY STOMACH four times a day      pantoprazole (PROTONIX) 40 MG tablet Take 1 tablet by mouth 2 times daily 60 tablet 3    Multiple Vitamin (MVI, CELEBRATE, CHEWABLE TABLET) Take 1 tablet by mouth 2 times daily.  cholestyramine (QUESTRAN) 4 G packet Take 1 packet by mouth 2 times daily (Patient not taking: Reported on 4/29/2021) 90 packet 3    medroxyPROGESTERone (DEPO-PROVERA) 150 MG/ML injection Inject 150 mg into the muscle every 3 months (Patient not taking: Reported on 5/20/2021)  0    CALCIUM CITRATE, BARIATRIC ADVANTAGE, 500MG LOZENGE Take 1 lozenge by mouth 3 times daily. (Patient not taking: Reported on 6/24/2021)       No current facility-administered medications for this visit. Vital Signs:  /80 (Site: Right Upper Arm, Position: Sitting, Cuff Size: Large Adult)   Pulse 60   Ht 5' 6\" (1.676 m)   Wt 292 lb (132.5 kg)   BMI 47.13 kg/m²     BMI/Height/Weight:  Body mass index is 47.13 kg/m². Review of Systems - A review of systems was performed. All was negative unless otherwise documented in HPI. Constitutional: Negative for fever, chills and diaphoresis. HENT: Negative for hearing loss and trouble swallowing. Eyes: Negative for photophobia and visual disturbance. Respiratory: Negative for cough, shortness of breath and wheezing. Cardiovascular: Negative for chest pain and palpitations. Gastrointestinal: Negative for nausea, vomiting, abdominal pain, diarrhea, constipation, blood in stool and abdominal distention. Endocrine: Negative for polydipsia, polyphagia and polyuria. Genitourinary: Negative for dysuria, frequency, hematuria and difficulty urinating. Musculoskeletal: Negative for myalgias, joint swelling. Skin: Negative for pallor and rash.    Neurological: Negative for dizziness, tremors, light-headedness and headaches. Psychiatric/Behavioral: Negative for sleep disturbance and dysphoric mood. Objective:      Physical Exam   Vital signs reviewed. General: Well-developed and well-nourished. No acute distress. Skin: Warm, dry and intact. HEENT: Normocephalic. EOMs intact. Conjunctivae normal. Neck supple. Cardiovascular: Normal rate, regular rhythm. Pulmonary/Chest: Normal effort. Lungs clear to auscultation. No rales, rhonchi or wheezing. Abdominal: Positive bowel sounds. Soft, nontender. Nondistended. Musculoskeletal: Movement x4. No edema. Neurological: Gait normal. Alert and oriented to person, place, and time. Psychiatric: Normal mood and affect. Speech and behavior normal. Judgment and thought content normal. Cognition and memory intact. Assessment:       Diagnosis Orders   1. Gastroesophageal reflux disease, unspecified whether esophagitis present     2. PCOS (polycystic ovarian syndrome)     3. S/P laparoscopic sleeve gastrectomy     4. Iron deficiency anemia, unspecified iron deficiency anemia type     5. Morbid obesity ([address]         Plan:    Dietitian visit today. Patient was encouraged to journal all food intake. Keep calorie level at approximately 4943-0138. Protein intake is to be a minimum of 60-80 grams per day. Water drinking was encouraged with a goal of 64oz-128oz daily. Beverages to be calorie free except for milk. Every other beverage should be water. Avoid soda. Continue to increase level of physical activity. Encouraged use of exercise log. H. Pylori breath test negative. Follow-up  No follow-ups on file. Orders this encounter:  No orders of the defined types were placed in this encounter. Prescriptions this encounter:  No orders of the defined types were placed in this encounter.       Electronically signed by:  Sima Mckenzie CNP

## 2021-07-22 NOTE — PROGRESS NOTES
Medical Nutrition Therapy   Metabolic and Bariatric Surgery         Supervised diet and exercise preparation  Visit 3 out of 3  Pt reports:      Changes in eating patterns to promote health are noted below on the goals number 22-25    Vitals: Wt Readings from Last 3 Encounters:   07/22/21 292 lb (132.5 kg)   06/24/21 298 lb (135.2 kg)   05/20/21 297 lb (134.7 kg)         Nutrition Assessment:   PES: Knowledge deficit related to healthy behaviors that support weight management post weight loss surgery as evidenced by Body mass index is 47.13 kg/m². Nutrition Assessment of Goal Attainment:  TREATMENT GOALS:    1. Pt  Completed 2 out of 2 goals. 2.TREATMENT GOALS FOR UPCOMING WEEK: continue all previous goals and add: # 0    All goals were planned with and agreed on by the patient. Continue following bariatric behaviors. 100%  Continue logging exercise. 100%  Read binder. 100%                                                         Do you understand your goals? y    Do you have the information you need to achieve your goals? y    Do you have any questions  right now? n        [x]  Consistent goal achievement in the program thus far and further success with goals is expected. []  Unable to consistently make progress in goal achievement. At this time patient is not moving forward  in developing the skills needed for success after surgery. Plan:    Continue to follow monthly and review goals.          [x]  Nutrition visits complete    []

## 2021-07-23 ENCOUNTER — TELEPHONE (OUTPATIENT)
Dept: BARIATRICS/WEIGHT MGMT | Age: 31
End: 2021-07-23

## 2021-08-04 DIAGNOSIS — E55.9 VITAMIN D DEFICIENCY: ICD-10-CM

## 2021-08-04 RX ORDER — ERGOCALCIFEROL 1.25 MG/1
CAPSULE ORAL
Qty: 8 CAPSULE | Refills: 0 | Status: ON HOLD | OUTPATIENT
Start: 2021-08-04 | End: 2021-09-17 | Stop reason: HOSPADM

## 2021-08-27 RX ORDER — SODIUM CHLORIDE, SODIUM LACTATE, POTASSIUM CHLORIDE, CALCIUM CHLORIDE 600; 310; 30; 20 MG/100ML; MG/100ML; MG/100ML; MG/100ML
1000 INJECTION, SOLUTION INTRAVENOUS CONTINUOUS
Status: CANCELLED | OUTPATIENT
Start: 2021-08-27

## 2021-08-30 ENCOUNTER — NURSE ONLY (OUTPATIENT)
Dept: BARIATRICS/WEIGHT MGMT | Age: 31
End: 2021-08-30

## 2021-08-31 ENCOUNTER — HOSPITAL ENCOUNTER (OUTPATIENT)
Dept: GENERAL RADIOLOGY | Age: 31
Discharge: HOME | End: 2021-09-02
Payer: MEDICARE

## 2021-08-31 ENCOUNTER — HOSPITAL ENCOUNTER (OUTPATIENT)
Dept: PREADMISSION TESTING | Age: 31
Discharge: HOME | End: 2021-09-04
Payer: MEDICARE

## 2021-08-31 VITALS
BODY MASS INDEX: 45.99 KG/M2 | HEIGHT: 67 IN | RESPIRATION RATE: 18 BRPM | OXYGEN SATURATION: 97 % | SYSTOLIC BLOOD PRESSURE: 126 MMHG | TEMPERATURE: 98.6 F | WEIGHT: 293 LBS | HEART RATE: 64 BPM | DIASTOLIC BLOOD PRESSURE: 92 MMHG

## 2021-08-31 LAB
ANION GAP SERPL CALCULATED.3IONS-SCNC: 17 MMOL/L (ref 9–17)
BUN BLDV-MCNC: 18 MG/DL (ref 6–20)
BUN/CREAT BLD: ABNORMAL (ref 9–20)
CALCIUM SERPL-MCNC: 9 MG/DL (ref 8.6–10.4)
CHLORIDE BLD-SCNC: 101 MMOL/L (ref 98–107)
CO2: 20 MMOL/L (ref 20–31)
CREAT SERPL-MCNC: 0.38 MG/DL (ref 0.5–0.9)
GFR AFRICAN AMERICAN: >60 ML/MIN
GFR NON-AFRICAN AMERICAN: >60 ML/MIN
GFR SERPL CREATININE-BSD FRML MDRD: ABNORMAL ML/MIN/{1.73_M2}
GFR SERPL CREATININE-BSD FRML MDRD: ABNORMAL ML/MIN/{1.73_M2}
GLUCOSE BLD-MCNC: 76 MG/DL (ref 70–99)
HCT VFR BLD CALC: 39.9 % (ref 36.3–47.1)
HEMOGLOBIN: 12.4 G/DL (ref 11.9–15.1)
INR BLD: 1
MCH RBC QN AUTO: 25.8 PG (ref 25.2–33.5)
MCHC RBC AUTO-ENTMCNC: 31.1 G/DL (ref 28.4–34.8)
MCV RBC AUTO: 83 FL (ref 82.6–102.9)
NRBC AUTOMATED: 0 PER 100 WBC
PARTIAL THROMBOPLASTIN TIME: 21.7 SEC (ref 20.5–30.5)
PDW BLD-RTO: 15.6 % (ref 11.8–14.4)
PLATELET # BLD: 273 K/UL (ref 138–453)
PMV BLD AUTO: 11.3 FL (ref 8.1–13.5)
POTASSIUM SERPL-SCNC: 4 MMOL/L (ref 3.7–5.3)
PROTHROMBIN TIME: 10.3 SEC (ref 9.1–12.3)
RBC # BLD: 4.81 M/UL (ref 3.95–5.11)
SODIUM BLD-SCNC: 138 MMOL/L (ref 135–144)
WBC # BLD: 8.5 K/UL (ref 3.5–11.3)

## 2021-08-31 PROCEDURE — 85730 THROMBOPLASTIN TIME PARTIAL: CPT

## 2021-08-31 PROCEDURE — 71046 X-RAY EXAM CHEST 2 VIEWS: CPT

## 2021-08-31 PROCEDURE — 36415 COLL VENOUS BLD VENIPUNCTURE: CPT

## 2021-08-31 PROCEDURE — 85027 COMPLETE CBC AUTOMATED: CPT

## 2021-08-31 PROCEDURE — G0480 DRUG TEST DEF 1-7 CLASSES: HCPCS

## 2021-08-31 PROCEDURE — 93005 ELECTROCARDIOGRAM TRACING: CPT | Performed by: SURGERY

## 2021-08-31 PROCEDURE — 80048 BASIC METABOLIC PNL TOTAL CA: CPT

## 2021-08-31 PROCEDURE — 85610 PROTHROMBIN TIME: CPT

## 2021-08-31 RX ORDER — HEPARIN SODIUM 5000 [USP'U]/ML
5000 INJECTION, SOLUTION INTRAVENOUS; SUBCUTANEOUS ONCE
Status: CANCELLED | OUTPATIENT
Start: 2021-08-31 | End: 2021-08-31

## 2021-08-31 RX ORDER — LANOLIN ALCOHOL/MO/W.PET/CERES
2500 CREAM (GRAM) TOPICAL DAILY
Status: ON HOLD | COMMUNITY
End: 2021-09-17 | Stop reason: HOSPADM

## 2021-08-31 NOTE — PROGRESS NOTES
Anesthesia Focused Assessment    Hx of anesthesia complications:  PONV  Family hx of anesthesia complications:  no      Prior + Covid-19 test? no      STOP-BANG Sleep Apnea Questionnaire    SNORE loudly (heard through closed doors)? Yes  TIRED, fatigued, sleepy during daytime? No  OBSERVED stopping breathing during sleep? No  High blood PRESSURE or being treated? No    BMI over 35? Yes  AGE over 48? No  NECK circumference over 16\"? Yes  GENDER (male)? No             Total 3  High risk 5-8  Intermediate risk 3-4  Low risk 0-2    ----------------------------------------------------------------------------------------------------------------------  KATELYNN                              No, reports previously diagnosed with KATELYNN but was retested and resolved  If yes, machine? No    DM1                                            No  DM2                   No    Coronary Artery Disease      No  HTN         No  Defib/AICD/Pacemaker               No             Renal Failure                   No  If yes, on dialysis           Active smoker? No  Drinks alcohol? Yes, rarely  Illicit drugs? No  Dentition?        benign      Past Medical History:   Diagnosis Date    Anemia     Anxiety     Cholelithiasis     GERD (gastroesophageal reflux disease)     History of asthma     sports induced    Kidney stones     Dr. Consuelo Mcneillhal    Migraine     Obesity     PCOS (polycystic ovarian syndrome)     PONV (postoperative nausea and vomiting)     S/P cholecystectomy 2-29-16    [facility], Dr. Sol Bowser S/P laparoscopic sleeve gastrectomy 1-5-15    start wt = 308.6lb, Dr. Tello Adjutant    Sleep apnea     no machine/resolved after sleeve gastrectomy    Wellness examination     PCP Leonora Guthrie MD/ e[facility] morales/ last seen 7-2021         Patient was evaluated in PAT & anesthesia guidelines were applied.    NPO guidelines, medication instructions and scheduled arrival time were reviewed with patient.                                                                                                                      Anesthesia contacted:   no    Medical or cardiac clearance ordered: medical clearance pending    XOCHITL Alvarado CNP   8/31/21  2:33 PM
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
## 2021-09-01 LAB
EKG ATRIAL RATE: 66 BPM
EKG P AXIS: 35 DEGREES
EKG P-R INTERVAL: 172 MS
EKG Q-T INTERVAL: 390 MS
EKG QRS DURATION: 82 MS
EKG QTC CALCULATION (BAZETT): 408 MS
EKG R AXIS: 0 DEGREES
EKG T AXIS: 24 DEGREES
EKG VENTRICULAR RATE: 66 BPM

## 2021-09-01 PROCEDURE — 93010 ELECTROCARDIOGRAM REPORT: CPT | Performed by: INTERNAL MEDICINE

## 2021-09-03 LAB
3-OH-COTININE: <2 NG/ML
COTININE: <2 NG/ML
NICOTINE: <2 NG/ML

## 2021-09-08 DIAGNOSIS — Z98.84 S/P BARIATRIC SURGERY: Primary | ICD-10-CM

## 2021-09-09 ENCOUNTER — OFFICE VISIT (OUTPATIENT)
Dept: BARIATRICS/WEIGHT MGMT | Age: 31
End: 2021-09-09
Payer: MEDICARE

## 2021-09-09 VITALS
HEART RATE: 64 BPM | SYSTOLIC BLOOD PRESSURE: 122 MMHG | WEIGHT: 286 LBS | DIASTOLIC BLOOD PRESSURE: 76 MMHG | RESPIRATION RATE: 20 BRPM | HEIGHT: 67 IN | BODY MASS INDEX: 44.89 KG/M2

## 2021-09-09 DIAGNOSIS — Z98.84 S/P LAPAROSCOPIC SLEEVE GASTRECTOMY: ICD-10-CM

## 2021-09-09 DIAGNOSIS — K44.9 HIATAL HERNIA WITH GERD: Primary | ICD-10-CM

## 2021-09-09 DIAGNOSIS — K21.9 HIATAL HERNIA WITH GERD: Primary | ICD-10-CM

## 2021-09-09 PROCEDURE — 1036F TOBACCO NON-USER: CPT | Performed by: SURGERY

## 2021-09-09 PROCEDURE — G8417 CALC BMI ABV UP PARAM F/U: HCPCS | Performed by: SURGERY

## 2021-09-09 PROCEDURE — 99213 OFFICE O/P EST LOW 20 MIN: CPT | Performed by: SURGERY

## 2021-09-09 PROCEDURE — G8427 DOCREV CUR MEDS BY ELIG CLIN: HCPCS | Performed by: SURGERY

## 2021-09-10 ENCOUNTER — HOSPITAL ENCOUNTER (OUTPATIENT)
Dept: LAB | Age: 31
Setting detail: SPECIMEN
Discharge: HOME | End: 2021-09-10
Payer: MEDICARE

## 2021-09-10 DIAGNOSIS — Z01.818 PREOP TESTING: Primary | ICD-10-CM

## 2021-09-10 PROCEDURE — U0005 INFEC AGEN DETEC AMPLI PROBE: HCPCS

## 2021-09-10 PROCEDURE — U0003 INFECTIOUS AGENT DETECTION BY NUCLEIC ACID (DNA OR RNA); SEVERE ACUTE RESPIRATORY SYNDROME CORONAVIRUS 2 (SARS-COV-2) (CORONAVIRUS DISEASE [COVID-19]), AMPLIFIED PROBE TECHNIQUE, MAKING USE OF HIGH THROUGHPUT TECHNOLOGIES AS DESCRIBED BY CMS-2020-01-R: HCPCS

## 2021-09-12 LAB
SARS-COV-2: NORMAL
SARS-COV-2: NOT DETECTED
SOURCE: NORMAL

## 2021-09-12 NOTE — PROGRESS NOTES
[facility] INVASIVE BARIATRIC SURG  [address]  [address]  [place] [facility] [place] [id number]  Dept: [phone]    SURGICAL WEIGHT MANAGEMENT PROGRAM   PROGRESS NOTE - SURGICAL EVALUATION    CC: Weight Loss       Patient: Claribel Richardson        Service Date: 9/9/2021       Medical Record #: [id number]    Patient History/Assessment Summary:    The patient is a pleasant 32y.o. year old female  with morbid obesity, who stands Height: 5' 7\" (170.2 cm) tall with a weight of Weight: 286 lb (129.7 kg) , resulting in a BMI of Body mass index is 44.79 kg/m². .     This patient is alone for the evaluation today. The patient is being evaluated to undergo weight loss surgery to treat the following comorbid conditions caused by her morbid obesity: GERD    She attended the weight loss surgery seminar, and attended bariatric education    Last Visit Weight:   Wt Readings from Last 3 Encounters:   09/09/21 286 lb (129.7 kg)   08/31/21 296 lb (134.3 kg)   07/22/21 292 lb (132.5 kg)     Today's weight is decreased from the last visit. Highest Weight: 286    The patient is being evaluated for Laparoscopic Roderick-en-Y Gastric Bypass and Laparoscopic Revisional Gastric Surgery. She  is here today to review the details of surgery. The patient acknowledges and understands the risks, benefits, and options we have discussed, as outlined in the Additional Informed Consent for this procedure. Patient also understands the importance of surgical and post-operative recommendations, including the operative diet and regular post-operative follow up care. The importance of ambulation and incentive spirometry was also discussed. All questions of this patient and any family members present have been answered to their satisfaction.     Physical Examination:     /76 (Site: Right Upper Arm, Position: Sitting, Cuff Size: Large Adult)   Pulse 64   Resp 20   Ht 5' 7\" (1.702 m)   Wt 286 lb (129.7 kg)   LMP 09/06/2021 (Exact Date) BMI 44.79 kg/m²   General This patient is awake, alert, and oriented, and is in no apparent distress. Cardiac Regular rate and rhythm without evidence of murmur. Respiratory Clear to auscultation bilaterally. Abdomen Obese, soft, non-tender, non-distended without masses/ No  evidence of abdominal hernia / Incisions consistent with previous surgeries. Head and Neck Obese, normocephalic and atraumatic/soft and supple, no  lymphadenopathy or obvious bruits. Extremeties No cyanosis, clubbing or edema/ No calf tenderness/No  restrictions of movement, is ambulatory without assistance. Neurological Intact x 4 extremities, no focal deficits noted   Skin No rashes or lesions noted   Rectal Deferred     RECOMMENDATIONS:       Diagnosis Orders   1. Hiatal hernia with GERD     2. S/P laparoscopic sleeve gastrectomy            We spent a great deal of time discussing the risks and benefits of Laparoscopic Roderick-en-Y Gastric Bypass and Laparoscopic Revisional Gastric Surgery, including but not limited to injury to intra-abdominal organs, breakdown of the gastric staple line, the need for re-operative therapy,  prolonged hospitalization,  mechanical ventilation,  and death. We discussed the possibility of bleeding, the need for blood transfusions, blood clots, hospital-acquired and intra-abdominal infection, anastomotic stricture, and worsening GERD. And we discussed the need for post-operative visit compliance, behavior modifications and diet changes, protein and vitamin supplementation, as well as routine scheduled and dedicated exercise. We discussed the potential weight loss benefit of approximately 60-70% of her excess body weight at 12-18 months post-op, as well as the possibility of insufficient weight loss or weight gain after 2 years post-operative time. The following was discussed with the patient:    DVT Prophylaxis    Increased risks of revisional surgery discussed with patient.  Risks of need for hiatal hernia repair discussed, including perforation of esophagus. She would like to move forward with surgery as her GERD persists. Option of no surgery given. EGD reviewed with patient    Electronically signed by Patricia Valerio DO on 9/12/2021 at 1:02 PM    Please note that this chart was generated using voice recognition Dragon dictation software. Although every effort was made to ensure the accuracy of this automated transcription, some errors in transcription may have occurred.

## 2021-09-13 ENCOUNTER — ANESTHESIA EVENT (OUTPATIENT)
Dept: INPATIENT UNIT | Age: 31
DRG: 403 | End: 2021-09-13
Payer: MEDICARE

## 2021-09-13 ENCOUNTER — TELEPHONE (OUTPATIENT)
Dept: BARIATRICS/WEIGHT MGMT | Age: 31
End: 2021-09-13

## 2021-09-13 RX ORDER — PROMETHAZINE HYDROCHLORIDE 25 MG/1
25 TABLET ORAL EVERY 6 HOURS PRN
Qty: 20 TABLET | Refills: 1 | Status: SHIPPED | OUTPATIENT
Start: 2021-09-13 | End: 2021-09-20

## 2021-09-13 RX ORDER — OXYCODONE HYDROCHLORIDE AND ACETAMINOPHEN 5; 325 MG/1; MG/1
1 TABLET ORAL EVERY 6 HOURS PRN
Qty: 28 TABLET | Refills: 0 | Status: ON HOLD | OUTPATIENT
Start: 2021-09-13 | End: 2021-09-14

## 2021-09-13 NOTE — TELEPHONE ENCOUNTER
Called patient and moved patient from 12:40 to 10:50 am  Patient to arrive at surgery center at 8:45 am.    Patient verbalized understanding.

## 2021-09-14 ENCOUNTER — ANESTHESIA (OUTPATIENT)
Dept: INPATIENT UNIT | Age: 31
DRG: 403 | End: 2021-09-14
Payer: MEDICARE

## 2021-09-14 ENCOUNTER — HOSPITAL ENCOUNTER (OUTPATIENT)
Age: 31
Setting detail: OUTPATIENT SURGERY
Discharge: HOME | DRG: 403 | End: 2021-09-14
Attending: SURGERY | Admitting: SURGERY
Payer: MEDICARE

## 2021-09-14 VITALS
RESPIRATION RATE: 7 BRPM | HEIGHT: 67 IN | TEMPERATURE: 97 F | HEART RATE: 57 BPM | WEIGHT: 285 LBS | DIASTOLIC BLOOD PRESSURE: 84 MMHG | OXYGEN SATURATION: 98 % | SYSTOLIC BLOOD PRESSURE: 145 MMHG | BODY MASS INDEX: 44.73 KG/M2

## 2021-09-14 PROCEDURE — 6370000000 HC RX 637 (ALT 250 FOR IP): Performed by: ANESTHESIOLOGY

## 2021-09-14 PROCEDURE — 2580000003 HC RX 258: Performed by: ANESTHESIOLOGY

## 2021-09-14 PROCEDURE — 6360000002 HC RX W HCPCS: Performed by: ANESTHESIOLOGY

## 2021-09-14 PROCEDURE — 6360000002 HC RX W HCPCS: Performed by: SURGERY

## 2021-09-14 RX ORDER — MIDAZOLAM HYDROCHLORIDE 2 MG/2ML
1 INJECTION, SOLUTION INTRAMUSCULAR; INTRAVENOUS EVERY 10 MIN PRN
Status: DISCONTINUED | OUTPATIENT
Start: 2021-09-14 | End: 2021-09-15

## 2021-09-14 RX ORDER — HEPARIN SODIUM 5000 [USP'U]/ML
5000 INJECTION, SOLUTION INTRAVENOUS; SUBCUTANEOUS ONCE
Status: COMPLETED | OUTPATIENT
Start: 2021-09-14 | End: 2021-09-14

## 2021-09-14 RX ORDER — FENTANYL CITRATE 50 UG/ML
25 INJECTION, SOLUTION INTRAMUSCULAR; INTRAVENOUS EVERY 5 MIN PRN
Status: DISCONTINUED | OUTPATIENT
Start: 2021-09-14 | End: 2021-09-15

## 2021-09-14 RX ORDER — SODIUM CHLORIDE, SODIUM LACTATE, POTASSIUM CHLORIDE, CALCIUM CHLORIDE 600; 310; 30; 20 MG/100ML; MG/100ML; MG/100ML; MG/100ML
INJECTION, SOLUTION INTRAVENOUS CONTINUOUS
Status: DISCONTINUED | OUTPATIENT
Start: 2021-09-14 | End: 2021-09-15

## 2021-09-14 RX ORDER — SODIUM CHLORIDE 0.9 % (FLUSH) 0.9 %
5-40 SYRINGE (ML) INJECTION PRN
Status: DISCONTINUED | OUTPATIENT
Start: 2021-09-14 | End: 2021-09-15

## 2021-09-14 RX ORDER — BUPIVACAINE HYDROCHLORIDE 5 MG/ML
40 INJECTION, SOLUTION EPIDURAL; INTRACAUDAL ONCE
Status: DISCONTINUED | OUTPATIENT
Start: 2021-09-14 | End: 2021-09-15

## 2021-09-14 RX ORDER — SODIUM CHLORIDE 0.9 % (FLUSH) 0.9 %
5-40 SYRINGE (ML) INJECTION EVERY 12 HOURS SCHEDULED
Status: DISCONTINUED | OUTPATIENT
Start: 2021-09-14 | End: 2021-09-15

## 2021-09-14 RX ORDER — MIDAZOLAM HYDROCHLORIDE 2 MG/2ML
1 INJECTION, SOLUTION INTRAMUSCULAR; INTRAVENOUS ONCE
Status: COMPLETED | OUTPATIENT
Start: 2021-09-14 | End: 2021-09-14

## 2021-09-14 RX ORDER — SODIUM CHLORIDE 9 MG/ML
25 INJECTION, SOLUTION INTRAVENOUS PRN
Status: DISCONTINUED | OUTPATIENT
Start: 2021-09-14 | End: 2021-09-15 | Stop reason: HOSPADM

## 2021-09-14 RX ORDER — SODIUM CHLORIDE, SODIUM LACTATE, POTASSIUM CHLORIDE, CALCIUM CHLORIDE 600; 310; 30; 20 MG/100ML; MG/100ML; MG/100ML; MG/100ML
1000 INJECTION, SOLUTION INTRAVENOUS CONTINUOUS
Status: DISCONTINUED | OUTPATIENT
Start: 2021-09-14 | End: 2021-09-15

## 2021-09-14 RX ORDER — LIDOCAINE HYDROCHLORIDE 10 MG/ML
1 INJECTION, SOLUTION EPIDURAL; INFILTRATION; INTRACAUDAL; PERINEURAL
Status: DISCONTINUED | OUTPATIENT
Start: 2021-09-14 | End: 2021-09-14 | Stop reason: HOSPADM

## 2021-09-14 RX ORDER — SCOLOPAMINE TRANSDERMAL SYSTEM 1 MG/1
1 PATCH, EXTENDED RELEASE TRANSDERMAL ONCE
Status: DISCONTINUED | OUTPATIENT
Start: 2021-09-14 | End: 2021-09-15

## 2021-09-14 RX ORDER — FENTANYL CITRATE 50 UG/ML
50 INJECTION, SOLUTION INTRAMUSCULAR; INTRAVENOUS ONCE
Status: COMPLETED | OUTPATIENT
Start: 2021-09-14 | End: 2021-09-14

## 2021-09-14 RX ADMIN — FENTANYL CITRATE 100 MCG: 50 INJECTION, SOLUTION INTRAMUSCULAR; INTRAVENOUS at 09:52

## 2021-09-14 RX ADMIN — MIDAZOLAM HYDROCHLORIDE 2 MG: 1 INJECTION, SOLUTION INTRAMUSCULAR; INTRAVENOUS at 09:52

## 2021-09-14 RX ADMIN — HEPARIN SODIUM 5000 UNITS: 5000 INJECTION INTRAVENOUS; SUBCUTANEOUS at 09:30

## 2021-09-14 RX ADMIN — SODIUM CHLORIDE, POTASSIUM CHLORIDE, SODIUM LACTATE AND CALCIUM CHLORIDE: 600; 310; 30; 20 INJECTION, SOLUTION INTRAVENOUS at 09:31

## 2021-09-14 ASSESSMENT — PAIN SCALES - GENERAL: PAINLEVEL_OUTOF10: 0

## 2021-09-14 ASSESSMENT — PAIN - FUNCTIONAL ASSESSMENT: PAIN_FUNCTIONAL_ASSESSMENT: 0-10

## 2021-09-14 NOTE — FLOWSHEET NOTE
Dr Viktor Olea to the bedside, time out performed @ 0952 , Pt monitored, 02, Rectus Sheath nerve block completed using 40 mL 0.5% Bupivacaine, 20 mL 0.5% Bupivacaine given per side,  pt tolerated procedure well,  Site CDI, (see charting)  Versed Given: 2 mg  Fentanyl Given: 100 mcg  Procedure Start: 5516  Procedure End: 1414    Local Used: 1% Lidocaine used.  Approx 2.5 mL injected into each side

## 2021-09-14 NOTE — ANESTHESIA PRE PROCEDURE
Department of Anesthesiology  Preprocedure Note       Name:  Sal Ashraf   Age:  32 y.o.  :  1990                                          MRN:  [id number]         Date:  2021      Surgeon: Karly Carter):  Leydi Gurrola, DO    Procedure: Procedure(s):  XI ROBOTIC LAPAROSCOPIC GASTRIC BYPASS MAXIMILIANO-EN-Y, REVISION SLEEVE TO MAXIMILIANO-EN-Y. EGD, LIVER BIOPSY, GI UNIT SCHEDULED    Medications prior to admission:   Prior to Admission medications    Medication Sig Start Date End Date Taking? Authorizing Provider   enoxaparin (LOVENOX) 60 MG/0.6ML injection Inject 0.6 mLs into the skin 2 times daily 21   Leydi Other, DO   promethazine (PHENERGAN) 25 MG tablet Take 1 tablet by mouth every 6 hours as needed for Nausea 21  Leydi Other, DO   oxyCODONE-acetaminophen (PERCOCET) 5-325 MG per tablet Take 1 tablet by mouth every 6 hours as needed for Pain for up to 7 days. Intended supply: 7 days. Take lowest dose possible to manage pain 21  Leydi Other, DO   vitamin B-12 (CYANOCOBALAMIN) 1000 MCG tablet Take 2,500 mcg by mouth daily    Historical Provider, MD   vitamin D (ERGOCALCIFEROL) 1.25 MG (19132 UT) CAPS capsule take 1 capsule by mouth every week for 8 WEEKS  Patient taking differently: Take 50,000 Units by mouth once a week 21   XOCHITL Roberts CNP   ferrous sulfate (IRON 325) 325 (65 Fe) MG tablet Take 1 tablet by mouth daily (with breakfast) 6/10/21   XOCHITL Roberts CNP   FLUoxetine (PROZAC) 20 MG capsule take 1 capsule by mouth once daily 21   Historical Provider, MD   pantoprazole (PROTONIX) 40 MG tablet Take 1 tablet by mouth 2 times daily 10/9/15   XOCHITL Roberts CNP   CALCIUM CITRATE, BARIATRIC ADVANTAGE, 500MG LOZENGE Take 1 lozenge by mouth daily     Historical Provider, MD   Multiple Vitamin (MVI, CELEBRATE, CHEWABLE TABLET) Take 1 tablet by mouth 2 times daily.     Historical Provider, MD       Current medications:    No current facility-administered medications for this encounter. Allergies:  No Known Allergies    Problem List:    Patient Active Problem List   Diagnosis Code    [facility] E[facility].01    PCOS (polycystic ovarian syndrome) E28.2    Migraine G43.909    S/P laparoscopic sleeve gastrectomy Z98.84    Iron deficiency anemia D50.9    GERD (gastroesophageal reflux disease) K21.9    Epigastric pain R10.13    RUQ abdominal pain R10.11    S/P cholecystectomy Z90.49       Past Medical History:        Diagnosis Date    Anemia     Anxiety     Cholelithiasis     GERD (gastroesophageal reflux disease)     History of asthma     sports induced    Kidney stones     Dr. Cr Reynolds    Migraine     Obesity     PCOS (polycystic ovarian syndrome)     PONV (postoperative nausea and vomiting)     S/P cholecystectomy 2-29-16    [employer], Dr. Benito Holstein S/P laparoscopic sleeve gastrectomy 1-5-15    start wt = 308.6lb, Dr. Jadon Larson, [employer]    Sleep apnea     no machine/resolved after sleeve gastrectomy    Wellness examination     PCP Dione Taylor MD/ e[facility] morales/ last seen 7-2021       Past Surgical History:        Procedure Laterality Date    CHOLECYSTECTOMY, LAPAROSCOPIC  02/29/16    X I robotic assisted    GASTRECTOMY  1/5/15    sleeve gastrectomy    HERNIA REPAIR      with sleeve surgery.  TUBAL LIGATION      2019       Social History:    Social History     Tobacco Use    Smoking status: Never Smoker    Smokeless tobacco: Never Used   Substance Use Topics    Alcohol use: Yes     Comment: social, willing to avoid for at least 6 mon post tania surg                                Counseling given: Not Answered      Vital Signs (Current): There were no vitals filed for this visit.                                            BP Readings from Last 3 Encounters:   09/09/21 122/76   08/31/21 (!) 126/92   07/22/21 110/80       NPO Status: normal                    Neuro/Psych:   (+) headaches: migraine headaches,             GI/Hepatic/Renal:   (+) GERD: no interval change, morbid obesity          Endo/Other: Negative Endo/Other ROS                    Abdominal:   (+) obese,           Vascular: negative vascular ROS. Other Findings:           Anesthesia Plan      general and regional     ASA 3       Induction: intravenous. Anesthetic plan and risks discussed with patient. Plan discussed with CRNA.                   Reynaldo Cruz MD   9/14/2021

## 2021-09-14 NOTE — PROGRESS NOTES
Dr. Luther Overcast at bedside to inform patient that today's case is cancelled and she would be rescheduled for tomorrow. Patient then received a call from Dr. Kali Valente office to be in pre-op tomorrow at 36 for surgery tomorrow.

## 2021-09-14 NOTE — INTERVAL H&P NOTE
Pt Name: Sandra Do  MRN: [id number]  YOB: 1990  Date of evaluation: 9/14/2021    I have reviewed the patient's history and physical examination completed in pre-admission testing.     Changes to history or on examination, if any, are as follows:  none    Daniel Mahoney PA-C  9/14/21  9:02 AM

## 2021-09-15 ENCOUNTER — ANESTHESIA EVENT (OUTPATIENT)
Dept: OPERATING ROOM | Age: 31
DRG: 403 | End: 2021-09-15
Payer: MEDICARE

## 2021-09-15 ENCOUNTER — HOSPITAL ENCOUNTER (INPATIENT)
Age: 31
LOS: 2 days | Discharge: HOME | DRG: 403 | End: 2021-09-17
Attending: SURGERY | Admitting: SURGERY
Payer: MEDICARE

## 2021-09-15 ENCOUNTER — ANESTHESIA (OUTPATIENT)
Dept: OPERATING ROOM | Age: 31
DRG: 403 | End: 2021-09-15
Payer: MEDICARE

## 2021-09-15 VITALS — OXYGEN SATURATION: 92 % | TEMPERATURE: 97.2 F | SYSTOLIC BLOOD PRESSURE: 130 MMHG | DIASTOLIC BLOOD PRESSURE: 91 MMHG

## 2021-09-15 DIAGNOSIS — Z98.84 S/P GASTRIC BYPASS: Primary | ICD-10-CM

## 2021-09-15 LAB
ANION GAP SERPL CALCULATED.3IONS-SCNC: 16 MMOL/L (ref 9–17)
BUN BLDV-MCNC: 9 MG/DL (ref 6–20)
BUN/CREAT BLD: ABNORMAL (ref 9–20)
CALCIUM SERPL-MCNC: 8.5 MG/DL (ref 8.6–10.4)
CHLORIDE BLD-SCNC: 100 MMOL/L (ref 98–107)
CO2: 19 MMOL/L (ref 20–31)
CREAT SERPL-MCNC: 1.07 MG/DL (ref 0.5–0.9)
GFR AFRICAN AMERICAN: >60 ML/MIN
GFR NON-AFRICAN AMERICAN: 60 ML/MIN
GFR SERPL CREATININE-BSD FRML MDRD: ABNORMAL ML/MIN/{1.73_M2}
GFR SERPL CREATININE-BSD FRML MDRD: ABNORMAL ML/MIN/{1.73_M2}
GLUCOSE BLD-MCNC: 207 MG/DL (ref 70–99)
HCT VFR BLD CALC: 44 % (ref 36.3–47.1)
HEMOGLOBIN: 13.6 G/DL (ref 11.9–15.1)
MCH RBC QN AUTO: 26.8 PG (ref 25.2–33.5)
MCHC RBC AUTO-ENTMCNC: 30.9 G/DL (ref 28.4–34.8)
MCV RBC AUTO: 86.8 FL (ref 82.6–102.9)
NRBC AUTOMATED: 0 PER 100 WBC
PDW BLD-RTO: 15.4 % (ref 11.8–14.4)
PLATELET # BLD: 270 K/UL (ref 138–453)
PMV BLD AUTO: 11.1 FL (ref 8.1–13.5)
POTASSIUM SERPL-SCNC: 4.5 MMOL/L (ref 3.7–5.3)
RBC # BLD: 5.07 M/UL (ref 3.95–5.11)
SODIUM BLD-SCNC: 135 MMOL/L (ref 135–144)
WBC # BLD: 15.2 K/UL (ref 3.5–11.3)

## 2021-09-15 PROCEDURE — 6360000002 HC RX W HCPCS: Performed by: NURSE ANESTHETIST, CERTIFIED REGISTERED

## 2021-09-15 PROCEDURE — 2500000003 HC RX 250 WO HCPCS: Performed by: SURGERY

## 2021-09-15 PROCEDURE — 0DJ08ZZ INSPECTION OF UPPER INTESTINAL TRACT, VIA NATURAL OR ARTIFICIAL OPENING ENDOSCOPIC: ICD-10-PCS | Performed by: SURGERY

## 2021-09-15 PROCEDURE — 44202 LAP ENTERECTOMY: CPT | Performed by: SURGERY

## 2021-09-15 PROCEDURE — 43281 LAP PARAESOPHAG HERN REPAIR: CPT | Performed by: SURGERY

## 2021-09-15 PROCEDURE — 2500000003 HC RX 250 WO HCPCS: Performed by: NURSE ANESTHETIST, CERTIFIED REGISTERED

## 2021-09-15 PROCEDURE — 8E0W4CZ ROBOTIC ASSISTED PROCEDURE OF TRUNK REGION, PERCUTANEOUS ENDOSCOPIC APPROACH: ICD-10-PCS | Performed by: SURGERY

## 2021-09-15 PROCEDURE — 7100000000 HC PACU RECOVERY - FIRST 15 MIN: Performed by: SURGERY

## 2021-09-15 PROCEDURE — 80048 BASIC METABOLIC PNL TOTAL CA: CPT

## 2021-09-15 PROCEDURE — 7100000001 HC PACU RECOVERY - ADDTL 15 MIN: Performed by: SURGERY

## 2021-09-15 PROCEDURE — 2580000003 HC RX 258: Performed by: SURGERY

## 2021-09-15 PROCEDURE — S2900 ROBOTIC SURGICAL SYSTEM: HCPCS | Performed by: SURGERY

## 2021-09-15 PROCEDURE — 6360000002 HC RX W HCPCS: Performed by: SURGERY

## 2021-09-15 PROCEDURE — 2709999900 HC NON-CHARGEABLE SUPPLY: Performed by: SURGERY

## 2021-09-15 PROCEDURE — 0DBA4ZX EXCISION OF JEJUNUM, PERCUTANEOUS ENDOSCOPIC APPROACH, DIAGNOSTIC: ICD-10-PCS | Performed by: SURGERY

## 2021-09-15 PROCEDURE — 2720000010 HC SURG SUPPLY STERILE: Performed by: SURGERY

## 2021-09-15 PROCEDURE — 0D164ZA BYPASS STOMACH TO JEJUNUM, PERCUTANEOUS ENDOSCOPIC APPROACH: ICD-10-PCS | Performed by: SURGERY

## 2021-09-15 PROCEDURE — 88305 TISSUE EXAM BY PATHOLOGIST: CPT

## 2021-09-15 PROCEDURE — 3700000001 HC ADD 15 MINUTES (ANESTHESIA): Performed by: SURGERY

## 2021-09-15 PROCEDURE — 0DH64UZ INSERTION OF FEEDING DEVICE INTO STOMACH, PERCUTANEOUS ENDOSCOPIC APPROACH: ICD-10-PCS | Performed by: SURGERY

## 2021-09-15 PROCEDURE — 0BQT4ZZ REPAIR DIAPHRAGM, PERCUTANEOUS ENDOSCOPIC APPROACH: ICD-10-PCS | Performed by: SURGERY

## 2021-09-15 PROCEDURE — 2580000003 HC RX 258: Performed by: NURSE ANESTHETIST, CERTIFIED REGISTERED

## 2021-09-15 PROCEDURE — 85027 COMPLETE CBC AUTOMATED: CPT

## 2021-09-15 PROCEDURE — 81025 URINE PREGNANCY TEST: CPT

## 2021-09-15 PROCEDURE — 3700000000 HC ANESTHESIA ATTENDED CARE: Performed by: SURGERY

## 2021-09-15 PROCEDURE — 1200000000 HC SEMI PRIVATE

## 2021-09-15 PROCEDURE — 6360000002 HC RX W HCPCS: Performed by: ANESTHESIOLOGY

## 2021-09-15 PROCEDURE — 43644 LAP GASTRIC BYPASS/ROUX-EN-Y: CPT | Performed by: SURGERY

## 2021-09-15 PROCEDURE — 3600000009 HC SURGERY ROBOT BASE: Performed by: SURGERY

## 2021-09-15 PROCEDURE — 6370000000 HC RX 637 (ALT 250 FOR IP): Performed by: ANESTHESIOLOGY

## 2021-09-15 PROCEDURE — 3600000019 HC SURGERY ROBOT ADDTL 15MIN: Performed by: SURGERY

## 2021-09-15 PROCEDURE — 0DN64ZZ RELEASE STOMACH, PERCUTANEOUS ENDOSCOPIC APPROACH: ICD-10-PCS | Performed by: SURGERY

## 2021-09-15 RX ORDER — HEPARIN SODIUM 5000 [USP'U]/ML
5000 INJECTION, SOLUTION INTRAVENOUS; SUBCUTANEOUS ONCE
Status: CANCELLED | OUTPATIENT
Start: 2021-09-15 | End: 2021-09-15

## 2021-09-15 RX ORDER — ROCURONIUM BROMIDE 10 MG/ML
INJECTION, SOLUTION INTRAVENOUS PRN
Status: DISCONTINUED | OUTPATIENT
Start: 2021-09-15 | End: 2021-09-15 | Stop reason: SDUPTHER

## 2021-09-15 RX ORDER — MAGNESIUM HYDROXIDE 1200 MG/15ML
LIQUID ORAL CONTINUOUS PRN
Status: COMPLETED | OUTPATIENT
Start: 2021-09-15 | End: 2021-09-15

## 2021-09-15 RX ORDER — FENTANYL CITRATE 50 UG/ML
50 INJECTION, SOLUTION INTRAMUSCULAR; INTRAVENOUS EVERY 5 MIN PRN
Status: COMPLETED | OUTPATIENT
Start: 2021-09-15 | End: 2021-09-15

## 2021-09-15 RX ORDER — LIDOCAINE HYDROCHLORIDE 10 MG/ML
INJECTION, SOLUTION EPIDURAL; INFILTRATION; INTRACAUDAL; PERINEURAL PRN
Status: DISCONTINUED | OUTPATIENT
Start: 2021-09-15 | End: 2021-09-15 | Stop reason: SDUPTHER

## 2021-09-15 RX ORDER — SCOLOPAMINE TRANSDERMAL SYSTEM 1 MG/1
1 PATCH, EXTENDED RELEASE TRANSDERMAL ONCE
Status: DISCONTINUED | OUTPATIENT
Start: 2021-09-15 | End: 2021-09-16

## 2021-09-15 RX ORDER — MEPERIDINE HYDROCHLORIDE 50 MG/ML
12.5 INJECTION INTRAMUSCULAR; INTRAVENOUS; SUBCUTANEOUS EVERY 5 MIN PRN
Status: DISCONTINUED | OUTPATIENT
Start: 2021-09-15 | End: 2021-09-15 | Stop reason: HOSPADM

## 2021-09-15 RX ORDER — HEPARIN SODIUM 5000 [USP'U]/ML
5000 INJECTION, SOLUTION INTRAVENOUS; SUBCUTANEOUS EVERY 8 HOURS SCHEDULED
Status: DISCONTINUED | OUTPATIENT
Start: 2021-09-15 | End: 2021-09-17 | Stop reason: HOSPADM

## 2021-09-15 RX ORDER — ONDANSETRON 2 MG/ML
INJECTION INTRAMUSCULAR; INTRAVENOUS PRN
Status: DISCONTINUED | OUTPATIENT
Start: 2021-09-15 | End: 2021-09-15

## 2021-09-15 RX ORDER — SODIUM CHLORIDE, SODIUM LACTATE, POTASSIUM CHLORIDE, CALCIUM CHLORIDE 600; 310; 30; 20 MG/100ML; MG/100ML; MG/100ML; MG/100ML
INJECTION, SOLUTION INTRAVENOUS CONTINUOUS
Status: DISCONTINUED | OUTPATIENT
Start: 2021-09-15 | End: 2021-09-16

## 2021-09-15 RX ORDER — IPRATROPIUM BROMIDE AND ALBUTEROL SULFATE 2.5; .5 MG/3ML; MG/3ML
1 SOLUTION RESPIRATORY (INHALATION)
Status: DISCONTINUED | OUTPATIENT
Start: 2021-09-15 | End: 2021-09-17 | Stop reason: HOSPADM

## 2021-09-15 RX ORDER — OXYCODONE HYDROCHLORIDE AND ACETAMINOPHEN 5; 325 MG/1; MG/1
1 TABLET ORAL EVERY 4 HOURS PRN
Status: DISCONTINUED | OUTPATIENT
Start: 2021-09-15 | End: 2021-09-17 | Stop reason: HOSPADM

## 2021-09-15 RX ORDER — INDOCYANINE GREEN AND WATER 25 MG
KIT INJECTION PRN
Status: DISCONTINUED | OUTPATIENT
Start: 2021-09-15 | End: 2021-09-15 | Stop reason: SDUPTHER

## 2021-09-15 RX ORDER — HEPARIN SODIUM 5000 [USP'U]/ML
5000 INJECTION, SOLUTION INTRAVENOUS; SUBCUTANEOUS ONCE
Status: COMPLETED | OUTPATIENT
Start: 2021-09-15 | End: 2021-09-15

## 2021-09-15 RX ORDER — FENTANYL CITRATE 50 UG/ML
25 INJECTION, SOLUTION INTRAMUSCULAR; INTRAVENOUS EVERY 5 MIN PRN
Status: DISCONTINUED | OUTPATIENT
Start: 2021-09-15 | End: 2021-09-15 | Stop reason: HOSPADM

## 2021-09-15 RX ORDER — GLYCOPYRROLATE 1 MG/5 ML
SYRINGE (ML) INTRAVENOUS PRN
Status: DISCONTINUED | OUTPATIENT
Start: 2021-09-15 | End: 2021-09-15 | Stop reason: SDUPTHER

## 2021-09-15 RX ORDER — FENTANYL CITRATE 50 UG/ML
INJECTION, SOLUTION INTRAMUSCULAR; INTRAVENOUS PRN
Status: DISCONTINUED | OUTPATIENT
Start: 2021-09-15 | End: 2021-09-15 | Stop reason: SDUPTHER

## 2021-09-15 RX ORDER — SCOLOPAMINE TRANSDERMAL SYSTEM 1 MG/1
1 PATCH, EXTENDED RELEASE TRANSDERMAL
Status: DISCONTINUED | OUTPATIENT
Start: 2021-09-15 | End: 2021-09-15

## 2021-09-15 RX ORDER — SODIUM CHLORIDE 0.9 % (FLUSH) 0.9 %
5-40 SYRINGE (ML) INJECTION EVERY 12 HOURS SCHEDULED
Status: DISCONTINUED | OUTPATIENT
Start: 2021-09-15 | End: 2021-09-17 | Stop reason: HOSPADM

## 2021-09-15 RX ORDER — PROPOFOL 10 MG/ML
INJECTION, EMULSION INTRAVENOUS PRN
Status: DISCONTINUED | OUTPATIENT
Start: 2021-09-15 | End: 2021-09-15 | Stop reason: SDUPTHER

## 2021-09-15 RX ORDER — PHENYLEPHRINE HCL IN 0.9% NACL 1 MG/10 ML
SYRINGE (ML) INTRAVENOUS PRN
Status: DISCONTINUED | OUTPATIENT
Start: 2021-09-15 | End: 2021-09-15 | Stop reason: SDUPTHER

## 2021-09-15 RX ORDER — SODIUM CHLORIDE 9 MG/ML
25 INJECTION, SOLUTION INTRAVENOUS PRN
Status: DISCONTINUED | OUTPATIENT
Start: 2021-09-15 | End: 2021-09-17 | Stop reason: HOSPADM

## 2021-09-15 RX ORDER — PROMETHAZINE HYDROCHLORIDE 25 MG/ML
12.5 INJECTION, SOLUTION INTRAMUSCULAR; INTRAVENOUS EVERY 6 HOURS PRN
Status: DISCONTINUED | OUTPATIENT
Start: 2021-09-15 | End: 2021-09-17 | Stop reason: HOSPADM

## 2021-09-15 RX ORDER — SCOLOPAMINE TRANSDERMAL SYSTEM 1 MG/1
1 PATCH, EXTENDED RELEASE TRANSDERMAL
Status: DISCONTINUED | OUTPATIENT
Start: 2021-09-15 | End: 2021-09-17 | Stop reason: HOSPADM

## 2021-09-15 RX ORDER — DEXAMETHASONE SODIUM PHOSPHATE 10 MG/ML
INJECTION INTRAMUSCULAR; INTRAVENOUS PRN
Status: DISCONTINUED | OUTPATIENT
Start: 2021-09-15 | End: 2021-09-15 | Stop reason: SDUPTHER

## 2021-09-15 RX ORDER — DIPHENHYDRAMINE HYDROCHLORIDE 50 MG/ML
INJECTION INTRAMUSCULAR; INTRAVENOUS PRN
Status: DISCONTINUED | OUTPATIENT
Start: 2021-09-15 | End: 2021-09-15 | Stop reason: SDUPTHER

## 2021-09-15 RX ORDER — MAGNESIUM HYDROXIDE 1200 MG/15ML
LIQUID ORAL PRN
Status: DISCONTINUED | OUTPATIENT
Start: 2021-09-15 | End: 2021-09-15 | Stop reason: ALTCHOICE

## 2021-09-15 RX ORDER — SODIUM CHLORIDE, SODIUM LACTATE, POTASSIUM CHLORIDE, CALCIUM CHLORIDE 600; 310; 30; 20 MG/100ML; MG/100ML; MG/100ML; MG/100ML
INJECTION, SOLUTION INTRAVENOUS CONTINUOUS PRN
Status: DISCONTINUED | OUTPATIENT
Start: 2021-09-15 | End: 2021-09-15 | Stop reason: SDUPTHER

## 2021-09-15 RX ORDER — SODIUM CHLORIDE 0.9 % (FLUSH) 0.9 %
5-40 SYRINGE (ML) INJECTION PRN
Status: DISCONTINUED | OUTPATIENT
Start: 2021-09-15 | End: 2021-09-17 | Stop reason: HOSPADM

## 2021-09-15 RX ORDER — ONDANSETRON 2 MG/ML
4 INJECTION INTRAMUSCULAR; INTRAVENOUS EVERY 6 HOURS PRN
Status: DISCONTINUED | OUTPATIENT
Start: 2021-09-15 | End: 2021-09-17 | Stop reason: HOSPADM

## 2021-09-15 RX ADMIN — SUGAMMADEX 400 MG: 100 INJECTION, SOLUTION INTRAVENOUS at 19:57

## 2021-09-15 RX ADMIN — ROCURONIUM BROMIDE 20 MG: 10 INJECTION INTRAVENOUS at 16:54

## 2021-09-15 RX ADMIN — ROCURONIUM BROMIDE 10 MG: 10 INJECTION INTRAVENOUS at 17:26

## 2021-09-15 RX ADMIN — Medication 0.2 MG: at 15:41

## 2021-09-15 RX ADMIN — Medication 3000 MG: at 15:33

## 2021-09-15 RX ADMIN — ROCURONIUM BROMIDE 10 MG: 10 INJECTION INTRAVENOUS at 15:32

## 2021-09-15 RX ADMIN — INDOCYANINE GREEN AND WATER 7.5 MG: KIT at 19:13

## 2021-09-15 RX ADMIN — ROCURONIUM BROMIDE 10 MG: 10 INJECTION INTRAVENOUS at 18:57

## 2021-09-15 RX ADMIN — Medication 200 MCG: at 15:20

## 2021-09-15 RX ADMIN — FAMOTIDINE 20 MG: 10 INJECTION INTRAVENOUS at 23:38

## 2021-09-15 RX ADMIN — FENTANYL CITRATE 100 MCG: 50 INJECTION, SOLUTION INTRAMUSCULAR; INTRAVENOUS at 15:11

## 2021-09-15 RX ADMIN — FENTANYL CITRATE 50 MCG: 50 INJECTION, SOLUTION INTRAMUSCULAR; INTRAVENOUS at 21:32

## 2021-09-15 RX ADMIN — INDOCYANINE GREEN AND WATER 7.5 MG: KIT at 17:31

## 2021-09-15 RX ADMIN — ONDANSETRON 4 MG: 2 INJECTION, SOLUTION INTRAMUSCULAR; INTRAVENOUS at 19:40

## 2021-09-15 RX ADMIN — INDOCYANINE GREEN AND WATER 7.5 MG: KIT at 16:57

## 2021-09-15 RX ADMIN — FENTANYL CITRATE 50 MCG: 50 INJECTION, SOLUTION INTRAMUSCULAR; INTRAVENOUS at 20:35

## 2021-09-15 RX ADMIN — DEXAMETHASONE SODIUM PHOSPHATE 10 MG: 10 INJECTION INTRAMUSCULAR; INTRAVENOUS at 15:37

## 2021-09-15 RX ADMIN — FENTANYL CITRATE 100 MCG: 50 INJECTION, SOLUTION INTRAMUSCULAR; INTRAVENOUS at 19:59

## 2021-09-15 RX ADMIN — Medication 3000 MG: at 19:23

## 2021-09-15 RX ADMIN — PROMETHAZINE HYDROCHLORIDE 12.5 MG: 25 INJECTION INTRAMUSCULAR; INTRAVENOUS at 20:27

## 2021-09-15 RX ADMIN — ROCURONIUM BROMIDE 50 MG: 10 INJECTION INTRAVENOUS at 15:12

## 2021-09-15 RX ADMIN — FENTANYL CITRATE 50 MCG: 50 INJECTION, SOLUTION INTRAMUSCULAR; INTRAVENOUS at 20:28

## 2021-09-15 RX ADMIN — FENTANYL CITRATE 75 MCG: 50 INJECTION, SOLUTION INTRAMUSCULAR; INTRAVENOUS at 19:00

## 2021-09-15 RX ADMIN — ROCURONIUM BROMIDE 20 MG: 10 INJECTION INTRAVENOUS at 18:15

## 2021-09-15 RX ADMIN — HYDROMORPHONE HYDROCHLORIDE 1 MG: 1 INJECTION, SOLUTION INTRAMUSCULAR; INTRAVENOUS; SUBCUTANEOUS at 23:37

## 2021-09-15 RX ADMIN — LIDOCAINE HYDROCHLORIDE 50 MG: 10 INJECTION, SOLUTION EPIDURAL; INFILTRATION; INTRACAUDAL; PERINEURAL at 15:11

## 2021-09-15 RX ADMIN — SODIUM CHLORIDE, POTASSIUM CHLORIDE, SODIUM LACTATE AND CALCIUM CHLORIDE: 600; 310; 30; 20 INJECTION, SOLUTION INTRAVENOUS at 15:06

## 2021-09-15 RX ADMIN — PROPOFOL 170 MG: 10 INJECTION, EMULSION INTRAVENOUS at 15:11

## 2021-09-15 RX ADMIN — HEPARIN SODIUM 5000 UNITS: 5000 INJECTION INTRAVENOUS; SUBCUTANEOUS at 12:47

## 2021-09-15 RX ADMIN — ROCURONIUM BROMIDE 10 MG: 10 INJECTION INTRAVENOUS at 16:27

## 2021-09-15 RX ADMIN — Medication 12.5 MG: at 16:08

## 2021-09-15 RX ADMIN — FENTANYL CITRATE 50 MCG: 50 INJECTION, SOLUTION INTRAMUSCULAR; INTRAVENOUS at 21:37

## 2021-09-15 RX ADMIN — METRONIDAZOLE 500 MG: 500 INJECTION, SOLUTION INTRAVENOUS at 15:35

## 2021-09-15 RX ADMIN — ROCURONIUM BROMIDE 10 MG: 10 INJECTION INTRAVENOUS at 17:45

## 2021-09-15 RX ADMIN — FENTANYL CITRATE 25 MCG: 50 INJECTION, SOLUTION INTRAMUSCULAR; INTRAVENOUS at 16:09

## 2021-09-15 RX ADMIN — SODIUM CHLORIDE, POTASSIUM CHLORIDE, SODIUM LACTATE AND CALCIUM CHLORIDE: 600; 310; 30; 20 INJECTION, SOLUTION INTRAVENOUS at 22:09

## 2021-09-15 RX ADMIN — DEXAMETHASONE SODIUM PHOSPHATE 10 MG: 10 INJECTION INTRAMUSCULAR; INTRAVENOUS at 15:53

## 2021-09-15 RX ADMIN — SODIUM CHLORIDE, POTASSIUM CHLORIDE, SODIUM LACTATE AND CALCIUM CHLORIDE: 600; 310; 30; 20 INJECTION, SOLUTION INTRAVENOUS at 18:26

## 2021-09-15 ASSESSMENT — PULMONARY FUNCTION TESTS
PIF_VALUE: 29
PIF_VALUE: 17
PIF_VALUE: 37
PIF_VALUE: 28
PIF_VALUE: 23
PIF_VALUE: 36
PIF_VALUE: 40
PIF_VALUE: 37
PIF_VALUE: 30
PIF_VALUE: 1
PIF_VALUE: 31
PIF_VALUE: 31
PIF_VALUE: 17
PIF_VALUE: 31
PIF_VALUE: 32
PIF_VALUE: 28
PIF_VALUE: 23
PIF_VALUE: 31
PIF_VALUE: 36
PIF_VALUE: 32
PIF_VALUE: 27
PIF_VALUE: 19
PIF_VALUE: 23
PIF_VALUE: 31
PIF_VALUE: 31
PIF_VALUE: 27
PIF_VALUE: 31
PIF_VALUE: 27
PIF_VALUE: 30
PIF_VALUE: 17
PIF_VALUE: 27
PIF_VALUE: 27
PIF_VALUE: 33
PIF_VALUE: 1
PIF_VALUE: 25
PIF_VALUE: 17
PIF_VALUE: 32
PIF_VALUE: 5
PIF_VALUE: 33
PIF_VALUE: 27
PIF_VALUE: 35
PIF_VALUE: 27
PIF_VALUE: 35
PIF_VALUE: 36
PIF_VALUE: 38
PIF_VALUE: 35
PIF_VALUE: 37
PIF_VALUE: 30
PIF_VALUE: 32
PIF_VALUE: 30
PIF_VALUE: 37
PIF_VALUE: 36
PIF_VALUE: 36
PIF_VALUE: 29
PIF_VALUE: 27
PIF_VALUE: 37
PIF_VALUE: 26
PIF_VALUE: 28
PIF_VALUE: 35
PIF_VALUE: 27
PIF_VALUE: 17
PIF_VALUE: 27
PIF_VALUE: 30
PIF_VALUE: 30
PIF_VALUE: 31
PIF_VALUE: 29
PIF_VALUE: 35
PIF_VALUE: 37
PIF_VALUE: 29
PIF_VALUE: 21
PIF_VALUE: 37
PIF_VALUE: 30
PIF_VALUE: 29
PIF_VALUE: 17
PIF_VALUE: 27
PIF_VALUE: 28
PIF_VALUE: 30
PIF_VALUE: 27
PIF_VALUE: 34
PIF_VALUE: 1
PIF_VALUE: 32
PIF_VALUE: 37
PIF_VALUE: 29
PIF_VALUE: 35
PIF_VALUE: 1
PIF_VALUE: 23
PIF_VALUE: 17
PIF_VALUE: 37
PIF_VALUE: 30
PIF_VALUE: 23
PIF_VALUE: 24
PIF_VALUE: 29
PIF_VALUE: 36
PIF_VALUE: 30
PIF_VALUE: 36
PIF_VALUE: 23
PIF_VALUE: 26
PIF_VALUE: 29
PIF_VALUE: 34
PIF_VALUE: 27
PIF_VALUE: 30
PIF_VALUE: 29
PIF_VALUE: 31
PIF_VALUE: 23
PIF_VALUE: 30
PIF_VALUE: 21
PIF_VALUE: 25
PIF_VALUE: 29
PIF_VALUE: 19
PIF_VALUE: 26
PIF_VALUE: 25
PIF_VALUE: 32
PIF_VALUE: 21
PIF_VALUE: 31
PIF_VALUE: 24
PIF_VALUE: 34
PIF_VALUE: 27
PIF_VALUE: 25
PIF_VALUE: 30
PIF_VALUE: 31
PIF_VALUE: 24
PIF_VALUE: 24
PIF_VALUE: 38
PIF_VALUE: 18
PIF_VALUE: 21
PIF_VALUE: 24
PIF_VALUE: 28
PIF_VALUE: 36
PIF_VALUE: 35
PIF_VALUE: 27
PIF_VALUE: 34
PIF_VALUE: 26
PIF_VALUE: 17
PIF_VALUE: 35
PIF_VALUE: 24
PIF_VALUE: 19
PIF_VALUE: 37
PIF_VALUE: 30
PIF_VALUE: 42
PIF_VALUE: 23
PIF_VALUE: 1
PIF_VALUE: 32
PIF_VALUE: 36
PIF_VALUE: 29
PIF_VALUE: 30
PIF_VALUE: 30
PIF_VALUE: 29
PIF_VALUE: 23
PIF_VALUE: 21
PIF_VALUE: 23
PIF_VALUE: 32
PIF_VALUE: 29
PIF_VALUE: 34
PIF_VALUE: 28
PIF_VALUE: 24
PIF_VALUE: 36
PIF_VALUE: 29
PIF_VALUE: 31
PIF_VALUE: 26
PIF_VALUE: 32
PIF_VALUE: 28
PIF_VALUE: 27
PIF_VALUE: 1
PIF_VALUE: 28
PIF_VALUE: 27
PIF_VALUE: 31
PIF_VALUE: 31
PIF_VALUE: 17
PIF_VALUE: 27
PIF_VALUE: 29
PIF_VALUE: 27
PIF_VALUE: 37
PIF_VALUE: 22
PIF_VALUE: 24
PIF_VALUE: 18
PIF_VALUE: 28
PIF_VALUE: 36
PIF_VALUE: 28
PIF_VALUE: 34
PIF_VALUE: 24
PIF_VALUE: 28
PIF_VALUE: 24
PIF_VALUE: 26
PIF_VALUE: 34
PIF_VALUE: 23
PIF_VALUE: 23
PIF_VALUE: 31
PIF_VALUE: 24
PIF_VALUE: 20
PIF_VALUE: 32
PIF_VALUE: 35
PIF_VALUE: 27
PIF_VALUE: 37
PIF_VALUE: 36
PIF_VALUE: 23
PIF_VALUE: 19
PIF_VALUE: 31
PIF_VALUE: 17
PIF_VALUE: 30
PIF_VALUE: 31
PIF_VALUE: 27
PIF_VALUE: 35
PIF_VALUE: 24
PIF_VALUE: 30
PIF_VALUE: 2
PIF_VALUE: 29
PIF_VALUE: 19
PIF_VALUE: 37
PIF_VALUE: 30
PIF_VALUE: 30
PIF_VALUE: 0
PIF_VALUE: 23
PIF_VALUE: 27
PIF_VALUE: 23
PIF_VALUE: 30
PIF_VALUE: 18
PIF_VALUE: 29
PIF_VALUE: 30
PIF_VALUE: 27
PIF_VALUE: 17
PIF_VALUE: 37
PIF_VALUE: 36
PIF_VALUE: 15
PIF_VALUE: 35
PIF_VALUE: 24
PIF_VALUE: 28
PIF_VALUE: 32
PIF_VALUE: 32
PIF_VALUE: 22
PIF_VALUE: 27
PIF_VALUE: 30
PIF_VALUE: 35
PIF_VALUE: 19
PIF_VALUE: 21
PIF_VALUE: 32
PIF_VALUE: 35
PIF_VALUE: 32
PIF_VALUE: 36
PIF_VALUE: 27
PIF_VALUE: 36
PIF_VALUE: 22
PIF_VALUE: 29
PIF_VALUE: 23
PIF_VALUE: 29
PIF_VALUE: 32
PIF_VALUE: 23
PIF_VALUE: 22
PIF_VALUE: 36
PIF_VALUE: 16
PIF_VALUE: 27
PIF_VALUE: 23
PIF_VALUE: 18
PIF_VALUE: 23
PIF_VALUE: 27
PIF_VALUE: 18
PIF_VALUE: 23
PIF_VALUE: 38
PIF_VALUE: 17
PIF_VALUE: 29
PIF_VALUE: 35
PIF_VALUE: 21
PIF_VALUE: 27
PIF_VALUE: 27
PIF_VALUE: 31
PIF_VALUE: 35
PIF_VALUE: 25
PIF_VALUE: 24
PIF_VALUE: 32
PIF_VALUE: 23
PIF_VALUE: 25
PIF_VALUE: 30
PIF_VALUE: 28
PIF_VALUE: 28
PIF_VALUE: 27
PIF_VALUE: 17
PIF_VALUE: 27
PIF_VALUE: 36
PIF_VALUE: 24
PIF_VALUE: 35
PIF_VALUE: 32
PIF_VALUE: 29
PIF_VALUE: 1
PIF_VALUE: 30
PIF_VALUE: 24
PIF_VALUE: 41
PIF_VALUE: 27
PIF_VALUE: 35
PIF_VALUE: 23
PIF_VALUE: 24
PIF_VALUE: 24
PIF_VALUE: 27
PIF_VALUE: 16
PIF_VALUE: 23
PIF_VALUE: 27
PIF_VALUE: 27
PIF_VALUE: 30
PIF_VALUE: 35
PIF_VALUE: 30
PIF_VALUE: 29
PIF_VALUE: 3
PIF_VALUE: 27
PIF_VALUE: 37
PIF_VALUE: 17
PIF_VALUE: 16
PIF_VALUE: 36
PIF_VALUE: 25
PIF_VALUE: 35
PIF_VALUE: 27
PIF_VALUE: 26
PIF_VALUE: 36
PIF_VALUE: 35

## 2021-09-15 ASSESSMENT — PAIN SCALES - GENERAL
PAINLEVEL_OUTOF10: 7
PAINLEVEL_OUTOF10: 6
PAINLEVEL_OUTOF10: 0
PAINLEVEL_OUTOF10: 7
PAINLEVEL_OUTOF10: 7
PAINLEVEL_OUTOF10: 6
PAINLEVEL_OUTOF10: 7
PAINLEVEL_OUTOF10: 7
PAINLEVEL_OUTOF10: 6
PAINLEVEL_OUTOF10: 6

## 2021-09-15 ASSESSMENT — PAIN DESCRIPTION - DESCRIPTORS: DESCRIPTORS: DISCOMFORT

## 2021-09-15 ASSESSMENT — PAIN DESCRIPTION - LOCATION: LOCATION: ABDOMEN

## 2021-09-15 ASSESSMENT — PAIN DESCRIPTION - PAIN TYPE: TYPE: SURGICAL PAIN

## 2021-09-15 ASSESSMENT — ENCOUNTER SYMPTOMS: SHORTNESS OF BREATH: 0

## 2021-09-15 ASSESSMENT — PAIN - FUNCTIONAL ASSESSMENT: PAIN_FUNCTIONAL_ASSESSMENT: 0-10

## 2021-09-15 NOTE — ANESTHESIA PRE PROCEDURE
Department of Anesthesiology  Preprocedure Note       Name:  Jose Pablo   Age:  32 y.o.  :  1990                                          MRN:  [id number]         Date:  9/15/2021      Surgeon: Dayanara Calhoun):  Troy Mancilla DO    Procedure: Procedure(s):  XI ROBOTIC LAPAROSCOPIC REVISION GASTRIC SLEEVE TO GASTRIC BYPASS MAXIMILIANO-EN-Y    Medications prior to admission:   Prior to Admission medications    Medication Sig Start Date End Date Taking? Authorizing Provider   promethazine (PHENERGAN) 25 MG tablet Take 1 tablet by mouth every 6 hours as needed for Nausea 21  Troy Mancilla DO   vitamin B-12 (CYANOCOBALAMIN) 1000 MCG tablet Take 2,500 mcg by mouth daily    Historical Provider, MD   vitamin D (ERGOCALCIFEROL) 1.25 MG (14646 UT) CAPS capsule take 1 capsule by mouth every week for 8 WEEKS  Patient taking differently: Take 50,000 Units by mouth once a week 21   XOCHITL Gu CNP   ferrous sulfate (IRON 325) 325 (65 Fe) MG tablet Take 1 tablet by mouth daily (with breakfast) 6/10/21   XOCHITL Gu CNP   FLUoxetine (PROZAC) 20 MG capsule take 1 capsule by mouth once daily 21   Historical Provider, MD   pantoprazole (PROTONIX) 40 MG tablet Take 1 tablet by mouth 2 times daily 10/9/15   XOCHITL Gu CNP   CALCIUM CITRATE, BARIATRIC ADVANTAGE, 500MG LOZENGE Take 1 lozenge by mouth daily     Historical Provider, MD   Multiple Vitamin (MVI, CELEBRATE, CHEWABLE TABLET) Take 1 tablet by mouth 2 times daily. Historical Provider, MD       Current medications:    No current facility-administered medications for this visit. No current outpatient medications on file.      Facility-Administered Medications Ordered in Other Visits   Medication Dose Route Frequency Provider Last Rate Last Admin    ceFAZolin (ANCEF) 3000 mg in dextrose 5 % 100 mL IVPB  3,000 mg IntraVENous Once Troy Mancilla DO        metronidazole (FLAGYL) 500 mg in NaCl 100 mL IVPB premix  500 mg IntraVENous Once Genevie Comment, DO           Allergies:  No Known Allergies    Problem List:    Patient Active Problem List   Diagnosis Code    Morbid obesity ([facility] [address] E66.01    PCOS (polycystic ovarian syndrome) E28.2    Migraine G43.909    S/P laparoscopic sleeve gastrectomy Z98.84    Iron deficiency anemia D50.9    GERD (gastroesophageal reflux disease) K21.9    Epigastric pain R10.13    RUQ abdominal pain R10.11    S/P cholecystectomy Z90.49       Past Medical History:        Diagnosis Date    Anemia     Anxiety     Cholelithiasis     GERD (gastroesophageal reflux disease)     History of asthma     sports induced    Kidney stones     Dr. Humberto Temple    Migraine     Obesity     PCOS (polycystic ovarian syndrome)     PONV (postoperative nausea and vomiting)     S/P cholecystectomy 2-29-16    [facility], Dr. Jamila Soares S/P laparoscopic sleeve gastrectomy 1-5-15    start wt = 308.6lb, Dr. Beverley Pena, [facility]    Sleep apnea     no machine/resolved after sleeve gastrectomy    Wellness examination     PCP Cody Duarte MD/ ez morales/ last seen 7-2021       Past Surgical History:        Procedure Laterality Date    CHOLECYSTECTOMY, LAPAROSCOPIC  02/29/16    X I robotic assisted    GASTRECTOMY  1/5/15    sleeve gastrectomy    HERNIA REPAIR      with sleeve surgery.  TUBAL LIGATION      2019       Social History:    Social History     Tobacco Use    Smoking status: Never Smoker    Smokeless tobacco: Never Used   Substance Use Topics    Alcohol use: Yes     Comment: social, willing to avoid for at least 6 mon post tania surg                                Counseling given: Not Answered      Vital Signs (Current): There were no vitals filed for this visit.                                            BP Readings from Last 3 Encounters:   09/15/21 110/70   09/14/21 (!) 145/84   09/09/21 122/76       NPO Status: BMI:   Wt Readings from Last 3 Encounters:   09/15/21 284 lb (128.8 kg)   09/14/21 285 lb (129.3 kg)   09/09/21 286 lb (129.7 kg)     There is no height or weight on file to calculate BMI.    CBC:   Lab Results   Component Value Date    WBC 8.5 08/31/2021    RBC 4.81 08/31/2021    HGB 12.4 08/31/2021    HCT 39.9 08/31/2021    MCV 83.0 08/31/2021    RDW 15.6 08/31/2021     08/31/2021       CMP:   Lab Results   Component Value Date     08/31/2021    K 4.0 08/31/2021     08/31/2021    CO2 20 08/31/2021    BUN 18 08/31/2021    CREATININE 0.38 08/31/2021    GFRAA >60 08/31/2021    LABGLOM >60 08/31/2021    GLUCOSE 76 08/31/2021    PROT 7.6 06/10/2021    CALCIUM 9.0 08/31/2021    BILITOT 0.29 06/10/2021    ALKPHOS 109 06/10/2021    AST 24 06/10/2021    ALT 19 06/10/2021       POC Tests: No results for input(s): POCGLU, POCNA, POCK, POCCL, POCBUN, POCHEMO, POCHCT in the last 72 hours. Coags:   Lab Results   Component Value Date    PROTIME 10.3 08/31/2021    PROTIME 11.2 02/29/2016    INR 1.0 08/31/2021    APTT 21.7 08/31/2021       HCG (If Applicable):   Lab Results   Component Value Date    PREGTESTUR POSITIVE (A) 11/02/2015    HCG NEGATIVE 02/29/2016        ABGs: No results found for: PHART, PO2ART, VLU6NUY, STP8FJY, BEART, L1FKEQSG     Type & Screen (If Applicable):  No results found for: LABABO, LABRH    Drug/Infectious Status (If Applicable):  No results found for: HIV, HEPCAB    COVID-19 Screening (If Applicable):   Lab Results   Component Value Date    COVID19 Not Detected 09/10/2021           Anesthesia Evaluation  Patient summary reviewed and Nursing notes reviewed   history of anesthetic complications: PONV.   Airway: Mallampati: III  TM distance: >3 FB   Neck ROM: full  Mouth opening: > = 3 FB Dental:          Pulmonary:normal exam    (+) sleep apnea: on CPAP,      (-) COPD, asthma, shortness of breath and recent URI                           Cardiovascular:Negative CV ROS  Exercise tolerance: good (>4 METS),       (-) hypertension, past MI, CAD, CABG/stent,  angina,  CHF, orthopnea and  PARK      Rhythm: regular  Rate: normal                    Neuro/Psych:   (+) headaches: migraine headaches,    (-) seizures, TIA and CVA           GI/Hepatic/Renal:   (+) GERD: no interval change, morbid obesity          Endo/Other: Negative Endo/Other ROS       (-) diabetes mellitus               Abdominal:   (+) obese,           Vascular: negative vascular ROS. Other Findings:             Anesthesia Plan      general and regional     ASA 3       Induction: intravenous. Anesthetic plan and risks discussed with patient. Plan discussed with CRNA.                   Juan F Vasquez MD   9/15/2021
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
## 2021-09-16 ENCOUNTER — APPOINTMENT (OUTPATIENT)
Dept: GENERAL RADIOLOGY | Age: 31
DRG: 403 | End: 2021-09-16
Attending: SURGERY
Payer: MEDICARE

## 2021-09-16 LAB
ANION GAP SERPL CALCULATED.3IONS-SCNC: 12 MMOL/L (ref 9–17)
BUN BLDV-MCNC: 7 MG/DL (ref 6–20)
BUN/CREAT BLD: ABNORMAL (ref 9–20)
CALCIUM SERPL-MCNC: 8.2 MG/DL (ref 8.6–10.4)
CHLORIDE BLD-SCNC: 101 MMOL/L (ref 98–107)
CO2: 24 MMOL/L (ref 20–31)
CREAT SERPL-MCNC: 0.82 MG/DL (ref 0.5–0.9)
GFR AFRICAN AMERICAN: >60 ML/MIN
GFR NON-AFRICAN AMERICAN: >60 ML/MIN
GFR SERPL CREATININE-BSD FRML MDRD: ABNORMAL ML/MIN/{1.73_M2}
GFR SERPL CREATININE-BSD FRML MDRD: ABNORMAL ML/MIN/{1.73_M2}
GLUCOSE BLD-MCNC: 150 MG/DL (ref 70–99)
HCG, PREGNANCY URINE (POC): NEGATIVE
HCT VFR BLD CALC: 37.2 % (ref 36.3–47.1)
HEMOGLOBIN: 11.7 G/DL (ref 11.9–15.1)
MCH RBC QN AUTO: 26.8 PG (ref 25.2–33.5)
MCHC RBC AUTO-ENTMCNC: 31.5 G/DL (ref 28.4–34.8)
MCV RBC AUTO: 85.1 FL (ref 82.6–102.9)
NRBC AUTOMATED: 0 PER 100 WBC
PDW BLD-RTO: 15.1 % (ref 11.8–14.4)
PLATELET # BLD: 235 K/UL (ref 138–453)
PMV BLD AUTO: 11.5 FL (ref 8.1–13.5)
POTASSIUM SERPL-SCNC: 4.1 MMOL/L (ref 3.7–5.3)
RBC # BLD: 4.37 M/UL (ref 3.95–5.11)
SODIUM BLD-SCNC: 137 MMOL/L (ref 135–144)
WBC # BLD: 11.3 K/UL (ref 3.5–11.3)

## 2021-09-16 PROCEDURE — 36415 COLL VENOUS BLD VENIPUNCTURE: CPT

## 2021-09-16 PROCEDURE — 94761 N-INVAS EAR/PLS OXIMETRY MLT: CPT

## 2021-09-16 PROCEDURE — 2500000003 HC RX 250 WO HCPCS: Performed by: SURGERY

## 2021-09-16 PROCEDURE — 6360000004 HC RX CONTRAST MEDICATION: Performed by: SURGERY

## 2021-09-16 PROCEDURE — 2580000003 HC RX 258: Performed by: SURGERY

## 2021-09-16 PROCEDURE — 6370000000 HC RX 637 (ALT 250 FOR IP): Performed by: SURGERY

## 2021-09-16 PROCEDURE — 6370000000 HC RX 637 (ALT 250 FOR IP): Performed by: STUDENT IN AN ORGANIZED HEALTH CARE EDUCATION/TRAINING PROGRAM

## 2021-09-16 PROCEDURE — 74220 X-RAY XM ESOPHAGUS 1CNTRST: CPT

## 2021-09-16 PROCEDURE — 2700000000 HC OXYGEN THERAPY PER DAY

## 2021-09-16 PROCEDURE — 80048 BASIC METABOLIC PNL TOTAL CA: CPT

## 2021-09-16 PROCEDURE — 85027 COMPLETE CBC AUTOMATED: CPT

## 2021-09-16 PROCEDURE — 94640 AIRWAY INHALATION TREATMENT: CPT

## 2021-09-16 PROCEDURE — 6360000002 HC RX W HCPCS: Performed by: SURGERY

## 2021-09-16 PROCEDURE — 51701 INSERT BLADDER CATHETER: CPT

## 2021-09-16 PROCEDURE — 51798 US URINE CAPACITY MEASURE: CPT

## 2021-09-16 PROCEDURE — 1200000000 HC SEMI PRIVATE

## 2021-09-16 RX ORDER — TAMSULOSIN HYDROCHLORIDE 0.4 MG/1
0.4 CAPSULE ORAL DAILY
Status: DISCONTINUED | OUTPATIENT
Start: 2021-09-16 | End: 2021-09-17 | Stop reason: HOSPADM

## 2021-09-16 RX ORDER — ACETAMINOPHEN 160 MG/5ML
650 SOLUTION ORAL EVERY 4 HOURS PRN
Status: DISCONTINUED | OUTPATIENT
Start: 2021-09-16 | End: 2021-09-17 | Stop reason: HOSPADM

## 2021-09-16 RX ADMIN — HYDROMORPHONE HYDROCHLORIDE 1 MG: 1 INJECTION, SOLUTION INTRAMUSCULAR; INTRAVENOUS; SUBCUTANEOUS at 19:18

## 2021-09-16 RX ADMIN — METRONIDAZOLE 500 MG: 500 INJECTION, SOLUTION INTRAVENOUS at 08:00

## 2021-09-16 RX ADMIN — IOHEXOL 30 ML: 240 INJECTION, SOLUTION INTRATHECAL; INTRAVASCULAR; INTRAVENOUS; ORAL at 08:50

## 2021-09-16 RX ADMIN — ONDANSETRON 4 MG: 2 INJECTION INTRAMUSCULAR; INTRAVENOUS at 02:00

## 2021-09-16 RX ADMIN — ONDANSETRON 4 MG: 2 INJECTION INTRAMUSCULAR; INTRAVENOUS at 09:48

## 2021-09-16 RX ADMIN — HEPARIN SODIUM 5000 UNITS: 5000 INJECTION INTRAVENOUS; SUBCUTANEOUS at 21:23

## 2021-09-16 RX ADMIN — HYDROMORPHONE HYDROCHLORIDE 1 MG: 1 INJECTION, SOLUTION INTRAMUSCULAR; INTRAVENOUS; SUBCUTANEOUS at 02:40

## 2021-09-16 RX ADMIN — HYDROMORPHONE HYDROCHLORIDE 1 MG: 1 INJECTION, SOLUTION INTRAMUSCULAR; INTRAVENOUS; SUBCUTANEOUS at 05:42

## 2021-09-16 RX ADMIN — HYDROMORPHONE HYDROCHLORIDE 1 MG: 1 INJECTION, SOLUTION INTRAMUSCULAR; INTRAVENOUS; SUBCUTANEOUS at 11:12

## 2021-09-16 RX ADMIN — Medication 3000 MG: at 11:12

## 2021-09-16 RX ADMIN — IPRATROPIUM BROMIDE AND ALBUTEROL SULFATE 1 AMPULE: .5; 3 SOLUTION RESPIRATORY (INHALATION) at 20:12

## 2021-09-16 RX ADMIN — SODIUM CHLORIDE, POTASSIUM CHLORIDE, SODIUM LACTATE AND CALCIUM CHLORIDE: 600; 310; 30; 20 INJECTION, SOLUTION INTRAVENOUS at 02:01

## 2021-09-16 RX ADMIN — FAMOTIDINE 20 MG: 10 INJECTION INTRAVENOUS at 21:22

## 2021-09-16 RX ADMIN — HEPARIN SODIUM 5000 UNITS: 5000 INJECTION INTRAVENOUS; SUBCUTANEOUS at 05:42

## 2021-09-16 RX ADMIN — IPRATROPIUM BROMIDE AND ALBUTEROL SULFATE 1 AMPULE: .5; 3 SOLUTION RESPIRATORY (INHALATION) at 14:53

## 2021-09-16 RX ADMIN — METRONIDAZOLE 500 MG: 500 INJECTION, SOLUTION INTRAVENOUS at 00:24

## 2021-09-16 RX ADMIN — Medication 3000 MG: at 03:14

## 2021-09-16 RX ADMIN — FAMOTIDINE 20 MG: 10 INJECTION INTRAVENOUS at 09:48

## 2021-09-16 RX ADMIN — SODIUM CHLORIDE, POTASSIUM CHLORIDE, SODIUM LACTATE AND CALCIUM CHLORIDE: 600; 310; 30; 20 INJECTION, SOLUTION INTRAVENOUS at 09:48

## 2021-09-16 RX ADMIN — TAMSULOSIN HYDROCHLORIDE 0.4 MG: 0.4 CAPSULE ORAL at 14:16

## 2021-09-16 RX ADMIN — HEPARIN SODIUM 5000 UNITS: 5000 INJECTION INTRAVENOUS; SUBCUTANEOUS at 14:12

## 2021-09-16 RX ADMIN — ACETAMINOPHEN 650 MG: 650 SOLUTION ORAL at 15:18

## 2021-09-16 ASSESSMENT — PAIN SCALES - GENERAL
PAINLEVEL_OUTOF10: 6
PAINLEVEL_OUTOF10: 8
PAINLEVEL_OUTOF10: 5
PAINLEVEL_OUTOF10: 7
PAINLEVEL_OUTOF10: 7
PAINLEVEL_OUTOF10: 3
PAINLEVEL_OUTOF10: 3
PAINLEVEL_OUTOF10: 6

## 2021-09-16 ASSESSMENT — PAIN DESCRIPTION - PAIN TYPE: TYPE: SURGICAL PAIN

## 2021-09-16 NOTE — CARE COORDINATION
Case Management Initial Discharge Plan  Mayra Tate,             Met with:patient and s.o. to discuss discharge plans. Information verified: address, contacts, phone number, , insurance Yes  Insurance Provider: [employer]    Emergency Contact/Next of Kin name & number:   Taylor Wiley (signficant other) [phone]  Gema Valente (father) [phone]  Who are involved in patient's support system? family    PCP: Karla Pennington MD  Date of last visit: aug 2021      Discharge Planning    Living Arrangements:  Spouse/Significant Other, Children     Home is 1 story with 1 step into home    Patient able to perform ADL's:Independent    Current Services (outpatient & in home) none  DME equipment: none  DME provider: n/a    Is patient receiving oral anticoagulation therapy? No      Potential Assistance Needed:  N/A    Patient agreeable to home care: No  [place] of choice provided:  n/a    Prior SNF/Rehab Placement and Facility: none  Agreeable to SNF/Rehab: No  [place] of choice provided: n/a     Evaluation: no    Expected Discharge date:  21    Patient expects to be discharged to:   home    If home: is the family and/or caregiver wiling & able to provide support at home? yes  Who will be providing this support? boyfriend    Follow Up Appointment: Best Day/ Time: Monday AM    Transportation provider: javan  Transportation arrangements needed for discharge: Yes    Readmission Risk              Risk of Unplanned Readmission:  10             Does patient have a readmission risk score greater than 14?: No  If yes, follow-up appointment must be made within 7 days of discharge.      Goals of Care:       Educated patient on transitional options, provided freedom of choice and are agreeable with plan      Discharge Plan: d/c home independently with boyfriend, has ride, has pcp f/u          Electronically signed by Chico Shrestha RN on 21 at 11:42 AM EDT

## 2021-09-16 NOTE — PROGRESS NOTES
Physician Discharge Summary     Patient ID:  Vince Cespedes  [id number]  25 y.o.  1990    Admit date: 9/15/2021    Discharge date and time: 9/17/2021    Admitting Physician: Rush Resendiz DO     Discharge Physician: Dr. Modesto Howell    Admission Diagnoses: S/P gastric bypass [Z98.84]    Discharge Diagnoses:   POD#1 s/p robotic assisted laparoscopic Roderick-en-Y gastric bypass, EGD    Admission Condition: stable    Discharged Condition: stable    Indication for Admission: Morbid Obesity, s/p [facility] Course: This is a 32 y.o. female who underwent robotic assisted laparoscopic sleeve gastrectomy converted to Roderick-En-Y gastric bypass, EGD on 9/15/2021 without any complications. Patient was kept NPO and had esophagogram on POD#1. The study was negative for any leaks. Patient was started on CLD. Pain was well controlled. Ambulating without any difficulty. Patient is to be d/cd home in stable condition on 9/17/2021. Consults: none    Discharge Exam:  CONSTITUTIONAL:  NAD, A&O x 3  LUNGS: Unlabored  CARDIOVASCULAR:  RRR  ABDOMEN: Soft, nondistended, appropriate TTP, dried dressing intact over port sites.   JANEEN drain intact with SS drainage, G tube site with minimal tenderness    Disposition: home    Patient Instructions:      Medication List      ASK your doctor about these medications    CALCIUM CITRATE (BARIATRIC ADVANTAGE) 500MG LOZENGE     ferrous sulfate 325 (65 Fe) MG tablet  Commonly known as: IRON 325  Take 1 tablet by mouth daily (with breakfast)     FLUoxetine 20 MG capsule  Commonly known as: PROZAC     MVI (CELEBRATE) CHEWABLE TABLET     pantoprazole 40 MG tablet  Commonly known as: Protonix  Take 1 tablet by mouth 2 times daily     promethazine 25 MG tablet  Commonly known as: PHENERGAN  Take 1 tablet by mouth every 6 hours as needed for Nausea     vitamin B-12 1000 MCG tablet  Commonly known as: CYANOCOBALAMIN     vitamin D 1.25 MG (45067 UT) Caps capsule  Commonly known as: ERGOCALCIFEROL  take 1 capsule by mouth every week for 8 WEEKS            Activity: no lifting or strenuous exercise for 6 weeks. Diet: clear liquids  Wound Care: keep wound clean and dry    Follow-up with Dr. Vicenta Wu in 2 weeks.     Signed:  Leeann Bowen MD  9/16/2021  1:27 PM

## 2021-09-16 NOTE — ANESTHESIA POSTPROCEDURE EVALUATION
Department of Anesthesiology  Postprocedure Note    Patient: David Birmingham  MRN: [id number]  YOB: 1990  Date of evaluation: 9/15/2021  Time:  9:52 PM     Procedure Summary     Date: 09/15/21 Room / Location: [facility] 19 / [facility]    Anesthesia Start: 6028 Anesthesia Stop: 2023    Procedure: XI ROBOTIC LAPAROSCOPIC REVISION GASTRIC SLEEVE TO GASTRIC BYPASS MAXIMILIANO-EN-Y, EGD,, LYSIS OF ADHESIONS, REPAIR OF RECURRENT HIATLA HERNIA, ROBOTIC G TUBE INSERTION, SMALL BOWEL RESECTION (N/A ) Diagnosis: (MORBID OBESITY, GERD, POLYCYSTIC OVARIAN SYNDROME, STATUS POST GASTRIC SLEEVE)    Surgeons: Sadia Samayoa DO Responsible Provider: Shawn Summers MD    Anesthesia Type: general, regional ASA Status: 3          Anesthesia Type: general, regional    Aron Phase I: Aron Score: 9    Aron Phase II:      Last vitals: Reviewed and per EMR flowsheets.        Anesthesia Post Evaluation    Patient location during evaluation: PACU  Patient participation: complete - patient participated  Level of consciousness: awake and alert  Pain score: 3  Airway patency: patent  Nausea & Vomiting: no vomiting and no nausea  Complications: no  Cardiovascular status: hemodynamically stable  Respiratory status: acceptable  Hydration status: stable

## 2021-09-16 NOTE — BRIEF OP NOTE
Brief Postoperative Note      Patient: Dalila Almanza  YOB: 1990  MRN: [id number]    Date of Procedure: 9/15/2021    Pre-Op Diagnosis: MORBID OBESITY, GERD, POLYCYSTIC OVARIAN SYNDROME, STATUS POST GASTRIC SLEEVE    Post-Op Diagnosis: Same       Procedure(s):  XI ROBOTIC LAPAROSCOPIC REVISION GASTRIC SLEEVE TO GASTRIC BYPASS MAXIMILIANO-EN-Y, EGD,, LYSIS OF ADHESIONS, REPAIR OF RECURRENT HIATLA HERNIA, ROBOTIC G TUBE INSERTION, SMALL BOWEL RESECTION    Surgeon(s):  Jose Luis Larry DO    Assistant:  First Assistant: Milind Byrne RN; Brock Heller RN    Anesthesia: General    Estimated Blood Loss (mL): Minimal    Complications: None    Specimens:   ID Type Source Tests Collected by Time Destination   A : PORTION OF SMALL BOWEL Tissue Jejunum SURGICAL PATHOLOGY Jose Luis Larry DO 9/15/2021 1951        Implants:  * No implants in log *      Drains:   Closed/Suction Drain Superior;Midline (Active)       [REMOVED] Urethral Catheter Non-latex 16 fr (Removed)       Findings:   Small bowel mass  Suspected old granuloma from leak  Hemostatic staple lines  Negative ICG leak test    Electronically signed by Jose Luis Larry DO on 9/15/2021 at 8:10 PM

## 2021-09-16 NOTE — PROGRESS NOTES
Bariatric Surgery:  Daily Progress Note          POD # 1 s/p sleeve to RNY, SBR, EGD          PATIENT NAME: Gama Valencia     TODAY'S DATE: 9/16/2021, 7:27 AM  SUBJECTIVE:     Pt seen and examined at bedside. Vitals stable. Patient did require straight cath x1 overnight. Reports she is thirsty. OBJECTIVE:   VITALS:  /72   Pulse 72   Temp 98 °F (36.7 °C) (Oral)   Resp 16   Ht 5' 7\" (1.702 m)   Wt 284 lb (128.8 kg)   LMP 09/06/2021 (Exact Date)   SpO2 100%   BMI 44.48 kg/m²      INTAKE/OUTPUT:      Intake/Output Summary (Last 24 hours) at 9/16/2021 0727  Last data filed at 9/16/2021 0540  Gross per 24 hour   Intake 3465 ml   Output 1280 ml   Net 2185 ml       PHYSICAL EXAM:  General Appearance: awake, alert, oriented, in no acute distress  HEENT:  Normocephalic, atraumatic, mucus membranes moist   Heart: Heart regular rate and rhythm  Lungs: Unlabored, on nasal cannula  Abdomen: Obese, soft, ttp at G tube site  Extremities: No cyanosis, pitting edema, rashes noted. Skin: Skin color, texture, turgor normal. No rashes or lesions.     Data:  CBC with Differential:    Lab Results   Component Value Date    WBC 15.2 09/15/2021    RBC 5.07 09/15/2021    HGB 13.6 09/15/2021    HCT 44.0 09/15/2021     09/15/2021    MCV 86.8 09/15/2021    MCH 26.8 09/15/2021    MCHC 30.9 09/15/2021    RDW 15.4 09/15/2021    LYMPHOPCT 24 06/10/2021    MONOPCT 5 06/10/2021    BASOPCT 0 06/10/2021    MONOSABS 0.34 06/10/2021    LYMPHSABS 1.63 06/10/2021    EOSABS 0.14 06/10/2021    BASOSABS 0.00 06/10/2021    DIFFTYPE NOT REPORTED 06/10/2021     BMP:    Lab Results   Component Value Date     09/15/2021    K 4.5 09/15/2021     09/15/2021    CO2 19 09/15/2021    BUN 9 09/15/2021    LABALBU 4.4 06/10/2021    CREATININE 1.07 09/15/2021    CALCIUM 8.5 09/15/2021    GFRAA >60 09/15/2021    LABGLOM 60 09/15/2021    GLUCOSE 207 09/15/2021         ASSESSMENT:  Active Hospital Problems    Diagnosis Date Noted    S/P gastric bypass [Z98.84] 09/15/2021       32 y.o. female with morbid obesity  - POD#1 s/p robotic sleeve to RNY, SBR, EGD  -Pathology: pending    Plan:  1. Diet: bariatric clears  2. Analgesia:  Dilaudid, percocet  3. GI prophylaxis: pepcid  4. DVT prophylaxis:  heparin  5. Activity:  Continue to encourage ambulation/activity w/ PT/OT  6. Continue to encourage incentive spirometry  7. JANEEN drain care, G tube care  8.  Esophogram  9. DC planning    Electronically signed by Giles Merrill MD  on 9/16/2021 at 7:27 AM

## 2021-09-16 NOTE — PROGRESS NOTES
Discussed bariatric instructions , had patient demonstrate IS, demonstrated drain care and did lovenox teaching Is This A New Presentation, Or A Follow-Up?: Skin Lesions How Severe Is Your Skin Lesion?: mild Have Your Skin Lesions Been Treated?: not been treated

## 2021-09-17 ENCOUNTER — APPOINTMENT (OUTPATIENT)
Dept: CT IMAGING | Age: 31
DRG: 403 | End: 2021-09-17
Attending: SURGERY
Payer: MEDICARE

## 2021-09-17 VITALS
OXYGEN SATURATION: 98 % | TEMPERATURE: 98.3 F | BODY MASS INDEX: 44.57 KG/M2 | HEART RATE: 72 BPM | HEIGHT: 67 IN | WEIGHT: 284 LBS | DIASTOLIC BLOOD PRESSURE: 87 MMHG | RESPIRATION RATE: 16 BRPM | SYSTOLIC BLOOD PRESSURE: 127 MMHG

## 2021-09-17 LAB
BILIRUBIN URINE: NEGATIVE
COLOR: YELLOW
COMMENT UA: ABNORMAL
GLUCOSE URINE: NEGATIVE
HCT VFR BLD CALC: 34.5 % (ref 36.3–47.1)
HEMOGLOBIN: 10.4 G/DL (ref 11.9–15.1)
KETONES, URINE: ABNORMAL
LEUKOCYTE ESTERASE, URINE: NEGATIVE
MCH RBC QN AUTO: 26.8 PG (ref 25.2–33.5)
MCHC RBC AUTO-ENTMCNC: 30.1 G/DL (ref 28.4–34.8)
MCV RBC AUTO: 88.9 FL (ref 82.6–102.9)
NITRITE, URINE: NEGATIVE
NRBC AUTOMATED: 0 PER 100 WBC
PDW BLD-RTO: 15.8 % (ref 11.8–14.4)
PH UA: 7 (ref 5–8)
PLATELET # BLD: 262 K/UL (ref 138–453)
PMV BLD AUTO: 11.6 FL (ref 8.1–13.5)
PROTEIN UA: NEGATIVE
RBC # BLD: 3.88 M/UL (ref 3.95–5.11)
SPECIFIC GRAVITY UA: 1.02 (ref 1–1.03)
SURGICAL PATHOLOGY REPORT: NORMAL
TURBIDITY: CLEAR
URINE HGB: NEGATIVE
UROBILINOGEN, URINE: NORMAL
WBC # BLD: 7.6 K/UL (ref 3.5–11.3)

## 2021-09-17 PROCEDURE — 6360000002 HC RX W HCPCS: Performed by: SURGERY

## 2021-09-17 PROCEDURE — 2500000003 HC RX 250 WO HCPCS: Performed by: SURGERY

## 2021-09-17 PROCEDURE — 2580000003 HC RX 258: Performed by: SURGERY

## 2021-09-17 PROCEDURE — 51798 US URINE CAPACITY MEASURE: CPT

## 2021-09-17 PROCEDURE — 6370000000 HC RX 637 (ALT 250 FOR IP): Performed by: STUDENT IN AN ORGANIZED HEALTH CARE EDUCATION/TRAINING PROGRAM

## 2021-09-17 PROCEDURE — 36415 COLL VENOUS BLD VENIPUNCTURE: CPT

## 2021-09-17 PROCEDURE — 74177 CT ABD & PELVIS W/CONTRAST: CPT

## 2021-09-17 PROCEDURE — 6360000004 HC RX CONTRAST MEDICATION: Performed by: STUDENT IN AN ORGANIZED HEALTH CARE EDUCATION/TRAINING PROGRAM

## 2021-09-17 PROCEDURE — 6370000000 HC RX 637 (ALT 250 FOR IP): Performed by: SURGERY

## 2021-09-17 PROCEDURE — 94640 AIRWAY INHALATION TREATMENT: CPT

## 2021-09-17 PROCEDURE — 94761 N-INVAS EAR/PLS OXIMETRY MLT: CPT

## 2021-09-17 PROCEDURE — 81003 URINALYSIS AUTO W/O SCOPE: CPT

## 2021-09-17 PROCEDURE — 85027 COMPLETE CBC AUTOMATED: CPT

## 2021-09-17 RX ORDER — OXYCODONE HYDROCHLORIDE AND ACETAMINOPHEN 5; 325 MG/1; MG/1
1 TABLET ORAL EVERY 6 HOURS PRN
Qty: 28 TABLET | Refills: 0 | Status: SHIPPED | OUTPATIENT
Start: 2021-09-17 | End: 2021-09-22 | Stop reason: ALTCHOICE

## 2021-09-17 RX ADMIN — IPRATROPIUM BROMIDE AND ALBUTEROL SULFATE 1 AMPULE: .5; 3 SOLUTION RESPIRATORY (INHALATION) at 15:34

## 2021-09-17 RX ADMIN — ACETAMINOPHEN 650 MG: 650 SOLUTION ORAL at 11:43

## 2021-09-17 RX ADMIN — SODIUM CHLORIDE, PRESERVATIVE FREE 10 ML: 5 INJECTION INTRAVENOUS at 08:47

## 2021-09-17 RX ADMIN — HEPARIN SODIUM 5000 UNITS: 5000 INJECTION INTRAVENOUS; SUBCUTANEOUS at 06:13

## 2021-09-17 RX ADMIN — FAMOTIDINE 20 MG: 10 INJECTION INTRAVENOUS at 08:47

## 2021-09-17 RX ADMIN — ACETAMINOPHEN 650 MG: 650 SOLUTION ORAL at 07:32

## 2021-09-17 RX ADMIN — IPRATROPIUM BROMIDE AND ALBUTEROL SULFATE 1 AMPULE: .5; 3 SOLUTION RESPIRATORY (INHALATION) at 09:31

## 2021-09-17 RX ADMIN — TAMSULOSIN HYDROCHLORIDE 0.4 MG: 0.4 CAPSULE ORAL at 08:47

## 2021-09-17 RX ADMIN — ACETAMINOPHEN 650 MG: 650 SOLUTION ORAL at 02:54

## 2021-09-17 RX ADMIN — IOPAMIDOL 75 ML: 755 INJECTION, SOLUTION INTRAVENOUS at 10:52

## 2021-09-17 RX ADMIN — OXYCODONE HYDROCHLORIDE AND ACETAMINOPHEN 1 TABLET: 5; 325 TABLET ORAL at 14:56

## 2021-09-17 RX ADMIN — HEPARIN SODIUM 5000 UNITS: 5000 INJECTION INTRAVENOUS; SUBCUTANEOUS at 14:54

## 2021-09-17 ASSESSMENT — PAIN SCALES - GENERAL
PAINLEVEL_OUTOF10: 2
PAINLEVEL_OUTOF10: 3
PAINLEVEL_OUTOF10: 2
PAINLEVEL_OUTOF10: 3
PAINLEVEL_OUTOF10: 3
PAINLEVEL_OUTOF10: 5
PAINLEVEL_OUTOF10: 2
PAINLEVEL_OUTOF10: 7

## 2021-09-17 NOTE — PROGRESS NOTES
Patient given discharge paperwork and all questions answered. Patient discharged via wheelchair with all of her belongings.

## 2021-09-17 NOTE — PROGRESS NOTES
Pt attempted to void, unsuccessful. Bladder scanned for 910ml. General surgery resident notified, pt walked around the unit, attempted again, unsuccessful. RN straight cathed pt for a 4th time since surgery for 700ml.

## 2021-09-17 NOTE — OP NOTE
Operative Note      Patient: Nikki Palafox  YOB: 1990  MRN: [id number]    Date of Procedure: 9/15/2021    Pre-Op Diagnosis: MORBID OBESITY BMI 44, GERD WITH HIATAL HERNIA, POLYCYSTIC OVARIAN SYNDROME, STATUS POST GASTRIC SLEEVE    Post-Op Diagnosis:   SUSPECTED SMALL BOWEL DIVERTICULA  SUSPECTED OLD RETAINED SLEEVE LEAK  PARAESOPHAGEAL HERNIA WITH UPPER 25% OF SLEEVE INTRATHORACIC, RECURRENT  EXTENSIVE ADHESIONS         Procedure(s):  XI ROBOTIC LAPAROSCOPIC REVISION GASTRIC SLEEVE TO GASTRIC BYPASS MAXIMILIANO-EN-Y, EGD,, LYSIS OF ADHESIONS, REPAIR OF RECURRENT HIATLA HERNIA, ROBOTIC G TUBE INSERTION, SMALL BOWEL RESECTION    Surgeon(s):  Phoebe Harris DO    Assistant:   First Assistant: Dilip Guerra RN; Carlos Stone RN    Anesthesia: General    Estimated Blood Loss (mL): Minimal    Complications: None    Specimens:   ID Type Source Tests Collected by Time Destination   A : PORTION OF SMALL BOWEL Tissue Jejunum SURGICAL PATHOLOGY Phoebe Harris DO 9/15/2021 1951        Implants:  * No implants in log *      Drains:   Closed/Suction Drain Superior;Midline (Active)   Site Description Unable to view 09/16/21 0710   Dressing Status Clean;Dry; Intact 09/16/21 0710   Drainage Appearance Bloody 09/16/21 0710   Status To bulb suction 09/16/21 0710       Gastrostomy/Enterostomy/Jejunostomy LUQ (Active)   Drain Status Clamped 09/16/21 0710       [REMOVED] Urethral Catheter Non-latex 16 fr (Removed)       Findings:   HEMOSTATIC STAPLE LINES  NEGATIVE ICG AND BUBBLE LEAK TEST  ICG USED TO CONFIRM ADEQUATE PERFUSION OF GASTROJEJUNAL ANASTOMOSIS  SMALL BOWEL DIVERTICULA VS OTHER  SUSPECTED OLD CONTAINED SLEEVE LEAK VS PANCREATIC CYST    Indications for procedure: The patient is a pleasant 32 y.o. female with morbid obesity and a HIATAL HERNIA after sleeve gastrectomy with GERD. They have a BMI of Body mass index is 44 kg/m².   They've completed medical risk stratification and are scheduled for a Roderick-en-Y gastric bypass. Consent: The patient was seen and evaluated in the office setting where the procedure was explained to the patient and all questions were answered. Discussion was held between Dr. Elizabeth Berman and the patient. Viable alternatives to the proposed procedure including but not limited to medical observation under the care of a physician exercise programs and other weight reductive operative procedures were explained to the patient. Risks of the proposed procedure including but not limited to hemorrhage requiring transfusion, infection, nerve injury, conversion to open procedure, anastomotic disruption, anastomotic stricture, pneumonia, pulmonary embolus, airway complications and death were explained to the patient. The patient understands the above alternatives and risks and has elected chosen laparoscopic Roderick-en-Y gastric bypass and wishes to proceed with the procedure. Description of procedure: The patient was taken to the operating room and placed in supine position. After successful induction of general endotracheal anesthesia by the anesthesia department a Christina catheter and orogastric tube was placed to decompress the bladder and stomach respectively. The patient was placed in split leg lithotomy position and care was taken to pad the exposed extremities. Then was prepped and draped in normal sterile fashion. A 12 mm Optiview trocar was placed in the left sub-costal position in the midclavicular line and access to the abdominal cavity was obtained under visualization. Pneumoperitoneum was then established without complications. After evaluation of the abdominal contents from this trocar position 5 other ports were placed under direct visualization. One at the umbilicus, one 2-3 cm above the umbilicus, one at the subxiphoid area, one in the right subcostal margin in the midclavicular line, and the last one further lateral to the original Optiview port.   A solution of 0.5% Marcaine was used to infiltrate the subcutaneous tissues prior to trocar placement. The robot docked. There was a large hiatal hernia and the liver normal sized. We lysed adhesions in the left upper quadrant for over 15 minutes to free the sleeve and omentum from the liver and abdominal wall to allow for placement of a liver retractor and port placement. There was a hiatal hernia and decision made for repair. At this time due to a hiatal hernia decision was made to perform hiatal hernia repair. We mobilized the pars flaccida until the right davonte was noted and we could begin our dissection. Using accommodation of blunt and sharp dissection as well as electrocautery a window was created along the right davonte along the esophagus. The avascular plane was entered and this facilitated our dissection, but adhesions again dense as she had prior hiatal hernia repair. Careful dissection circumferentially around the esophagus to expose the left davonte. The right and left vagus nerves were identified. Upon completion of full dissection around the esophagus we then used O Ethibond suture to reapproximate the anterior and posterior davonte. A 48 Albanian bougie was then passed by anesthesia under direct visualization until we could see this within the lumen of the stomach and this was across the crura in satisfactory position. Using the Ethibond we then closed the the hiatal hernia defect. A blunt probe could easily be passed between the crural defect closure and the esophagus while the bougie was in place. Satisfactory repair noted. After decompression of the stomach with the orogastric tube, the orogastric tube and any esophageal probes were removed from the patient. This was reviewed with the anesthesia team.  We then lysed adhesions between the sleeve and the liver. I had to clear adhesions along the sleeve to isolate the region between the pancreas and the sleeve.   At this point we ran into what appeared to be an old contained leak. This made dissection tedious as the adhesions were dense. The adhesions to the sleeve and staple line slowed dissection until we could clear this from the sleeve. We made a window along the lesser curvature through the pars flaccida with the Vessel Sealer and began the dissection. This allowed entry into the lesser sac. Once the lesser sac was entered a 60 mm x 2.5 mm ASMITA stapler was used to come across the neurovascular bundle. A green load 60 mm stapler then fired across the stomach. We did have to take down some more adhesions to free up the new gastric pouch. The adhesions were between the stomach and liver, stomach and pancreas, stomach and omentum. We lysed adhesions for over 50 minutes to mobilize the gastric pouch. There was a group of free staples on the omentum. There was also a group of staples posterior to the stomach adhered to the pancreas and stomach. The adhesions between the sleeve and pancreas were very dense necessitating meticulous dissection. The pouch was very free and mobile after we took down the adhesions. A 20-30 mL gastric pouch was created. After this was done the Orvil anvil for the EEA stapler was placed transorally. This anvil was attached to the an 18 [place] orogastric tube and was placed by anesthesia without complications. The orogastric tube was seen in the gastric pouch. The scissors was used to make a gastrotomy in the gastric pouch and the orogastric tube was pulled out of the gastric pouch. At this point the orogastric tube was detached from the anvil and removed, leaving the anvil in the gastric pouch. Having successfully completed our small gastric pouch and anvil placement, we turned our attention to dividing the greater omentum. This was accomplished with the Vessel Sealer.   After this we identified the ligament of Treitz and the inferior mesenteric vein, and went 55 cm distal to that to avoid what appeared to be a jejunal diverticula. A 3-0 silk suture was then placed to thomas the proximal bowel and then we divided the bowel using a 60 mm × 2.5 mm ASMITA stapler. The mesentery was also transected using a Vessel Sealer. I also transected off the area of the jejunal diverticula with a white load. This was sent for pathology. This was about a 4 cm piece of bowel that was resected. After this was done we brought the efferent limb all the way up to the gastric pouch in between our previously created defect in the greater omentum. The staple line and the jejunum was opened using electrocautery and after enlarging the lateral trocar site the 25 mm EEA stapler was introduced into the abdomen and into the open jejunum. Using the EEA stapler we performed our gastrojejunostomy. The stapler was removed and 2 complete donuts were identified. The open ended jejunum was closed using a 60 mm x 2.5 mm ASMITA stapler and bowel continuity reestablished. This amputated piece of jejunum was placed in an Endopouch and removed from the abdomen. We then measured distal on the small bowel and oriented our bowel 125 cm to create the jejunojejunostomy. An enterotomy was created in each limb with the cautery and using the triple stapling technique created a side-to-side jejunojejunostomy. 60 mm x 2.5 mm staple loads were utilized. The opening was evaluated for hemostasis and care was taken to make sure the posterior wall of the anastomosis was free. This was  directly visualized under laparoscopic visualization. The remaining free defect was closed with one or more firings of a 60 mm x 2.5 mm linear stapler. Numerous sutures of 3-0 Silk sutures were used to close the defect of the mesentery at the jejunojejunostomy. The piece of small bowel from the triple staple technique was then withdrawn from the abdomen. The staple line was noted to be intact and to have captured serosa the entire length of the anastomosis.  The gastric remnant without signs of bleeding or ischemia. I ran the limbs for confirmation of orientation and anatomy, confirmed. Having restored bowel continuity we placed multiple interrupted sutures to reinforce the gastrojejunal anastomosis. Interrupted 3-0 Vicryl sutures were placed laterally, posterior laterally and anteriorly so that we could circumferentially reinforce the anastomosis. The bowel was pink and viable, and there was no tension on the anastomosis. Having completed our reinforced sutures we then proceeded with checking the anastomosis for leakage. After completing the gastrojejunostomy, upper GI endoscopy was performed in order to evaluate the integrity of the anastomosis. The Olympus gastroscope was introduced through the mouth, through the esophagus and into the small gastric pouch. The anastomosis was noted to be patent widely. The lateral gastric staple line and gastrojejunal anastomosis were hemostatic. We did have to place additional 3 - 0 sutures at the anastomosis to obtain hemostasis  The scope passed through the anastomosis into the jejunum in the cali limb and blind pouch. The anastomosis was submerged under irrigation placed in the abdomen. There was no evidence of air extravasation on the intraoperative operative leak test performed. The air was evacuated and the scope removed from the patient. We then performed ICG leak test which was negative. ICG also used prior in the case to confirm adequate perfusion of the anastomosis. I then placed a G tube in the remnant stomach. The tube passed through the abdominal wall. Two 0 silk sutures used for purse string fashion. Gastrotomy made. The tube checked and passed into the sleeve remnant. The balloon inflated to 10 cc sterile water. The 0 silk sutures tied down. A 19 perforated Dereck drain was left posterior to the anastomosis and brought out through the right upper quadrant incision.   This was secured with a 2-0 silk suture. The abdomen was then copiously irrigated and checked for hemostasis. The effluent was evacuated from the abdomen and then we then turned our attention to closure of the trocar sites. At this point prior to evacuation of the pneumoperitoneum we closed all our 12 mm ports with laparoscopic suture fascial closure device using 0 Vicryl. Counts, including needle, sponge and staple loads reported to me as correct. The pneumoperitoneum was then evacuated. The wounds were irrigated and found to be hemostatic. The skin was closed using 4-0 Vicryl in a running subcuticular fashion. Steri-Strips were applied to the wounds the patient was awakened from anesthesia extubated and taken to recovery in stable condition. The patient's  updated to all findings. Area of prior suspected leak and small bowel diverticula/mass discussed.       Electronically signed by Leydi Gurrola DO on 9/16/2021 at 10:37 PM

## 2021-09-17 NOTE — PROGRESS NOTES
Bariatric Surgery:  Daily Progress Note          POD #2 s/p sleeve to RNY, SBR, EGD          PATIENT NAME: Luz Nava     TODAY'S DATE: 9/17/2021, 7:06 AM  SUBJECTIVE:     Pt seen and examined at bedside. Vitals stable. Patient did require repeated straight cath despite ambulation and flomax. Tolerating PO intake. OBJECTIVE:   VITALS:  BP (!) 106/59   Pulse 98   Temp 98 °F (36.7 °C) (Oral)   Resp 16   Ht 5' 7\" (1.702 m)   Wt 284 lb (128.8 kg)   LMP 09/06/2021 (Exact Date)   SpO2 95%   BMI 44.48 kg/m²      INTAKE/OUTPUT:      Intake/Output Summary (Last 24 hours) at 9/17/2021 0706  Last data filed at 9/16/2021 1900  Gross per 24 hour   Intake 1472 ml   Output 1950 ml   Net -478 ml       PHYSICAL EXAM:  General Appearance: awake, alert, oriented, in no acute distress  HEENT:  Normocephalic, atraumatic, mucus membranes moist   Heart: Heart regular rate and rhythm  Lungs: Unlabored, on nasal cannula  Abdomen: Obese, soft, ttp at G tube site  Extremities: No cyanosis, pitting edema, rashes noted. Skin: Skin color, texture, turgor normal. No rashes or lesions.     Data:  CBC with Differential:    Lab Results   Component Value Date    WBC 7.6 09/17/2021    RBC 3.88 09/17/2021    HGB 10.4 09/17/2021    HCT 34.5 09/17/2021     09/17/2021    MCV 88.9 09/17/2021    MCH 26.8 09/17/2021    MCHC 30.1 09/17/2021    RDW 15.8 09/17/2021    LYMPHOPCT 24 06/10/2021    MONOPCT 5 06/10/2021    BASOPCT 0 06/10/2021    MONOSABS 0.34 06/10/2021    LYMPHSABS 1.63 06/10/2021    EOSABS 0.14 06/10/2021    BASOSABS 0.00 06/10/2021    DIFFTYPE NOT REPORTED 06/10/2021     BMP:    Lab Results   Component Value Date     09/16/2021    K 4.1 09/16/2021     09/16/2021    CO2 24 09/16/2021    BUN 7 09/16/2021    LABALBU 4.4 06/10/2021    CREATININE 0.82 09/16/2021    CALCIUM 8.2 09/16/2021    GFRAA >60 09/16/2021    LABGLOM >60 09/16/2021    GLUCOSE 150 09/16/2021         ASSESSMENT:  Active Hospital Problems Diagnosis Date Noted    S/P gastric bypass [Z98.84] 09/15/2021       32 y.o. female with morbid obesity  - POD#2 s/p robotic sleeve to RNY, SBR, EGD  -Pathology: pending    Plan:  Diet: bariatric clears  Analgesia:  Dilaudid, tylenol, percocet  GI prophylaxis: pepcid  DVT prophylaxis:  heparin  Activity:  Continue to encourage ambulation/activity w/ PT/OT  Continue to encourage incentive spirometry  JANEEN drain care, G tube care  Pending CT scan to eval pancreas  Place hooks - plan for hooks 1 week and OP follow up with Dr Anna Preciado for removal and void trial  DC planning    Electronically signed by Doug Funk MD  on 9/17/2021 at 7:06 AM

## 2021-09-20 ENCOUNTER — TELEPHONE (OUTPATIENT)
Dept: BARIATRICS/WEIGHT MGMT | Age: 31
End: 2021-09-20

## 2021-09-20 NOTE — TELEPHONE ENCOUNTER
Post-op outreach call made to pt. Pt reports frequent ambulation around home. Pt wearing abd binder. No complaints of SOB or tachycardia. Laparoscopic incisional sites without problems. Drain is serous    Pt has not had a BM since surgery. Passing flatus. Agrees to use Milk of Magnesia or Miriaax and monitor for BM. Patient has a hooks and going to see dr Andrew Bryan for bladder training    Intake is described as water , protein shake and tea. Rates pain as 3 on a 0-10 scale. Pt using pain medication as directed. Allowed pt the opportunity to ask questions. Reminded patient to reference the patient education materials as needed. Reminded pt that they may phone the office or the \"after hours telephone number\"  that is listed in the patient education binder as needed. Pt verbalized understanding. Pt without concerns at this time    Post op office appt date and time reviewed with pt.     Has Lovenox is going well

## 2021-09-21 NOTE — DISCHARGE SUMMARY
Physician Discharge Summary     Patient ID:  Bassem Shay  [id number]  77 y.o.  1990    Admit date: 9/15/2021    Discharge date and time: 9/17/2021  6:12 PM     Admitting Physician: Anali Toure DO     Discharge Physician: Dr. Keshawn Galvan    Admission Diagnoses: S/P gastric bypass [Z98.84]    Discharge Diagnoses:   POD#1 s/p robotic assisted laparoscopic sleeve converted to Roderick-en-Y gastric bypass, EGD, G tube placement    Admission Condition: stable    Discharged Condition: stable    Indication for Admission: Morbid Obesity, s/p gastric bypass    Hospital Course: This is a 32 y.o. female who underwent robotic assisted laparoscopic Roderick-En-Y gastric bypass, EGD and G tube placement on 9/15/2021 without any complications. Patient was kept NPO and had esophagogram on POD#1. The study was negative for any leaks. Patient was started on CLD. Pain was well controlled. Ambulating without any difficulty. Patient is to be d/cd home in stable condition on 9/17/2021. Consults: none    Discharge Exam:  CONSTITUTIONAL:  NAD, A&O x 3  LUNGS:  RRR  CARDIOVASCULAR:  S1S2  ABDOMEN: Soft, nondistended, no TTP, dried dressing intact over port sites. JANEEN drain intact with SS drainage, G tube in place, clamped    Disposition: home    Patient Instructions:      Medication List      START taking these medications    enoxaparin 40 MG/0.4ML injection  Commonly known as: Lovenox  Inject 0.4 mLs into the skin 2 times daily for 14 days     oxyCODONE-acetaminophen 5-325 MG per tablet  Commonly known as: PERCOCET  Take 1 tablet by mouth every 6 hours as needed for Pain for up to 7 days.         CONTINUE taking these medications    FLUoxetine 20 MG capsule  Commonly known as: PROZAC     pantoprazole 40 MG tablet  Commonly known as: Protonix  Take 1 tablet by mouth 2 times daily        STOP taking these medications    CALCIUM CITRATE (BARIATRIC ADVANTAGE) 500MG LOZENGE     ferrous sulfate 325 (65 Fe) MG tablet  Commonly known

## 2021-09-22 ENCOUNTER — OFFICE VISIT (OUTPATIENT)
Dept: BARIATRICS/WEIGHT MGMT | Age: 31
End: 2021-09-22

## 2021-09-22 VITALS
TEMPERATURE: 97.8 F | HEIGHT: 67 IN | WEIGHT: 275 LBS | HEART RATE: 80 BPM | BODY MASS INDEX: 43.16 KG/M2 | DIASTOLIC BLOOD PRESSURE: 80 MMHG | SYSTOLIC BLOOD PRESSURE: 120 MMHG

## 2021-09-22 DIAGNOSIS — Z98.84 S/P BARIATRIC SURGERY: ICD-10-CM

## 2021-09-22 DIAGNOSIS — I72.8 SPLENIC ARTERY ANEURYSM (HCC): Primary | ICD-10-CM

## 2021-09-22 PROCEDURE — 99024 POSTOP FOLLOW-UP VISIT: CPT | Performed by: SURGERY

## 2021-09-22 NOTE — PROGRESS NOTES
Medical Nutrition Therapy  Metabolic and Bariatric surgery  1 week Post operative follow up         Pt reports:         Vitals:   Vitals:    09/22/21 1338   BP: 120/80   Site: Right Upper Arm   Position: Sitting   Cuff Size: Large Adult   Pulse: 80   Temp: 97.8 °F (36.6 °C)   Weight: 275 lb (124.7 kg)   Height: 5' 7\" (1.702 m)      Body mass index is 43.07 kg/m². Labs reviewed:              Nutrition Assessment:   PES: Inadequate food and beverage intake r/t WLS as evidenced by loss of excess body weight 9lb.       Goals   60-80gm of protein  48-64oz of fluid       [x] met     []  Not met        Plan:  Pt questions answered re diet advancement;  F/u 5 weeks post-op      Roselyn Killian RD, LD

## 2021-09-23 NOTE — PROGRESS NOTES
[facility]  [facility]  [address] [place] [address] [place]  [address] [place] [address]  Dept: [phone]    SURGICAL WEIGHT LOSS MANAGEMENT PROGRAM  PROGRESS NOTE     CC: Weight Loss    Patient: Vanessa Soni      Service Date: 9/22/2021  Visit:   1 week    Medical Record #: [id number]  Date of Surgery:   9/15/2021    Reason for Visit: Routine Post-Operative:  [] New Problem /   [] FU of existing problem    Patient here for 1 week visit after bypass. No nausea, vomiting. GERD resolved. Tolerating clears. Had a BM. No fevers    Height: 5' 7\" (1.702 m)  Highest Weight:   297lbs    Current Visit Weight Information  Weight: 275 lb (124.7 kg)   BMI: Body mass index is 43.07 kg/m². Weight loss since surgery:     22    1 wk - D/C'd home on VTE Prohylaxis? [x] Yes   [] No   On VTE Proph as directed? [x] Yes   [] No    Liver pathology:    [] NA    [] No Gross path    [] Liver Steatosis   [x] Discussed w/ pt   [] Referred to GI    Exercising?    [] Yes    [x] No     Review of Systems:     General  Negative for: [] Weight Change   [x] Fatigue   [x] Fevers & Chills    [] Appetite change [] Other:    Positive for: [x] Weight Change   [] Fatigue   [] Fevers & Chills    [] Appetite change [] Other:   Cardiac  Negative for: [x] Chest Pain   [x] Difficulty Breathing   [] Leg Cramps [x] Edema of Lower Extremeties    [] Left   [] Right      Positive for: [] Chest Pain   [] Difficulty Breathing   [] Leg Cramps [] Edema of Lower Extremeties    [] Left   [] Right   Pulmonary  Negative for: [x] Shortness of Breath [] Wheeze [] Cough  [x] Calf Pain     Positive for: [] Shortness of Breath [] Wheeze [] Cough  [] Calf Pain   Gastro-Intestinal Negative for: [x] Heartburn   [x] Reflux   [x] Dysphagia   [x] Melena   [] BRBPR  [x] Vomiting   [x] Abdominal Pain   [x] Diarrhea     [x] Constipation  [] Other:     Positive for: [] Heartburn   [] Reflux   [] Dysphagia   [] Melena   [] BRBPR  [] Vomiting   [] Abdominal Pain   [] Diarrhea     [] Constipation  [] Other:    Muskuloskeletal Negative for: [] Joint pain   [] Back pain   [] Knee Pain   [] Muscle weakness [x] Hernia   [x] Edema [] Other:     Positive for: [] Joint pain   [] Back pain   [] Knee Pain   [] Muscle weakness [] Hernia   [] Edema [] Other:    Neurologic Negative for: [x] Syncope   [x] Insomnia   [] Being treated for depression  [] Other:     Positive for: [] Syncope   [] Insomnia   [] Being treated for depression  [] Other:    Skin Negative for: [x] Wound:   [] Open   [] Draining   [] Incisional     [x] Rash   [] Hair Loss  [] Other:     Positive for: [] Wound:   [] Open   [] Draining    [] Incisional  [] Rash   [] Hair Loss  [] Other:          Physical Assessment:     /80 (Site: Right Upper Arm, Position: Sitting, Cuff Size: Large Adult)   Pulse 80   Temp 97.8 °F (36.6 °C)   Ht 5' 7\" (1.702 m)   Wt 275 lb (124.7 kg)   LMP 09/06/2021 (Exact Date)   BMI 43.07 kg/m²   General Negative for:  [x] Lapses in memory   [x] Unusual Stress   [] Diffic Concen [] Unable to sleep   [] Eating in response to stress   [] Other:    Positive for:  [] Lapses in memory   [] Unusual Stress   [] Diffic Concen [] Unable to sleep   [] Eating in response to stress   [] Other:   Cardio-Pulmonary   [x] Heart RRR   [x] No murmurs   [] Pulses nl x4 extrem    [x] Good inspiratory effort   [x] No wheezing     [x] Lungs clear to auscultation bilaterally    [] Other:    Gastro-Intestinal   [x] Abd S/NT/ND/Benign   [x] No abdominal mass/hernia    [x] No Splenomegaly    [] Other:    Muskuloskeletal   [x] Good muscle strength x4 extremities   [x] nl gait and ambul    [x] Nl ROM x4 extremities    [] Other:    Neurologic   [x] Alert and oriented x3    [] Other:   Skin   [x] Intact w/ no open wounds   [x] Incisions C/D/I    [] Steri strips removed   [x] No drainage or Infection    [] Other:      Assessment & Plan:      1. Splenic artery aneurysm ([facility] [address]    2.  S/P bariatric surgery [x] Advance Diet    [x] Daily protein (65-75gm/day)   [x] Take Vitamins   [x] Attend Support Group    [x] Exercise Regularly     [x] Use contraception:    [] NA    [] s/p Hysterectomy   [] s/p Tubal ligation   [] Other:     JANEEN and penrose removed    Ambulation, Lovenox    PPI for 6 months    Check CTA with suspected splenic artery aneurysm    Fulls    Start vitamins    Follow up 1 week    Follow up: No follow-ups on file. Orders placed this encounter:   Orders Placed This Encounter   Procedures    CTA ABDOMEN PELVIS W CONTRAST       New Prescriptions:   No orders of the defined types were placed in this encounter. Electronically signed by Ale Mac DO on 9/23/2021 at 5:18 PM    Please note that this chart was generated using voice recognition Dragon dictation software. Although every effort was made to ensure the accuracy of this automated transcription, some errors in transcription may have occurred.

## 2021-09-24 ENCOUNTER — TELEPHONE (OUTPATIENT)
Dept: BARIATRICS/WEIGHT MGMT | Age: 31
End: 2021-09-24

## 2021-09-29 ENCOUNTER — HOSPITAL ENCOUNTER (OUTPATIENT)
Dept: CT IMAGING | Age: 31
Discharge: HOME | End: 2021-10-01
Payer: MEDICARE

## 2021-09-29 DIAGNOSIS — I72.8 SPLENIC ARTERY ANEURYSM (HCC): ICD-10-CM

## 2021-09-29 PROCEDURE — 74174 CTA ABD&PLVS W/CONTRAST: CPT

## 2021-09-29 PROCEDURE — 2580000003 HC RX 258: Performed by: SURGERY

## 2021-09-29 PROCEDURE — 6360000004 HC RX CONTRAST MEDICATION: Performed by: SURGERY

## 2021-09-29 RX ORDER — 0.9 % SODIUM CHLORIDE 0.9 %
80 INTRAVENOUS SOLUTION INTRAVENOUS ONCE
Status: COMPLETED | OUTPATIENT
Start: 2021-09-29 | End: 2021-09-29

## 2021-09-29 RX ORDER — SODIUM CHLORIDE 0.9 % (FLUSH) 0.9 %
10 SYRINGE (ML) INJECTION PRN
Status: DISCONTINUED | OUTPATIENT
Start: 2021-09-29 | End: 2021-10-02 | Stop reason: HOSPADM

## 2021-09-29 RX ADMIN — SODIUM CHLORIDE, PRESERVATIVE FREE 10 ML: 5 INJECTION INTRAVENOUS at 11:08

## 2021-09-29 RX ADMIN — SODIUM CHLORIDE 80 ML: 9 INJECTION, SOLUTION INTRAVENOUS at 11:07

## 2021-09-29 RX ADMIN — IOPAMIDOL 100 ML: 755 INJECTION, SOLUTION INTRAVENOUS at 11:08

## 2021-09-30 ENCOUNTER — OFFICE VISIT (OUTPATIENT)
Dept: BARIATRICS/WEIGHT MGMT | Age: 31
End: 2021-09-30

## 2021-09-30 ENCOUNTER — TELEPHONE (OUTPATIENT)
Dept: BARIATRICS/WEIGHT MGMT | Age: 31
End: 2021-09-30

## 2021-09-30 VITALS
BODY MASS INDEX: 42.85 KG/M2 | SYSTOLIC BLOOD PRESSURE: 100 MMHG | WEIGHT: 273 LBS | HEIGHT: 67 IN | HEART RATE: 80 BPM | DIASTOLIC BLOOD PRESSURE: 60 MMHG

## 2021-09-30 DIAGNOSIS — I72.8 ANEURYSM, SPLENIC ARTERY (HCC): Primary | ICD-10-CM

## 2021-09-30 PROCEDURE — 99024 POSTOP FOLLOW-UP VISIT: CPT | Performed by: SURGERY

## 2021-09-30 NOTE — TELEPHONE ENCOUNTER
Notified IR at [facility] of order for transcath embolization, Silke Finch answered phone and will have the doctor review and call the patient

## 2021-09-30 NOTE — LETTER
[facility]  [address] [place]  [address] [facility]  Phone: [phone]  Fax: [address], DO        September 30, 2021     Patient: Oksana Dancer   YOB: 1990   Date of Visit: 9/30/2021       To Whom It May Concern: It is my medical opinion that Ramón Gilmore may return to work on 10/4/21 with the following restrictions: lifting/carrying not to exceed 20 lbs. .    If you have any questions or concerns, please don't hesitate to call.     Sincerely,        Zia Tyson DO

## 2021-10-02 ENCOUNTER — NURSE TRIAGE (OUTPATIENT)
Dept: OTHER | Age: 31
End: 2021-10-02

## 2021-10-02 NOTE — TELEPHONE ENCOUNTER
Sent message to on call Trauma Surgeon via ps; read @ 10:27am. Waiting for call back to call answering service. At 10:41am, received call from Dr. Carlos Boyd and informed of patient condition. Dr. Carlos Boyd stated to tell patient to go to [facility] ER to be evaluated. Called patient and informed. Patient verbalized understanding and states that she will be preparing and will be in [facility] ER in about an hour.   DAISHA CHILEL

## 2021-10-02 NOTE — TELEPHONE ENCOUNTER
Reason for Disposition   [1] Caller has URGENT question AND [2] triager unable to answer question    Answer Assessment - Initial Assessment Questions  1. SYMPTOM: \"What's the main symptom you're concerned about? \" (e.g., pain, fever, vomiting)     G-Tube full of blood. Whole tube. 2. ONSET: \"When did *No Answer*  start? \"      This morning, an hour ago  3. SURGERY: \"What surgery was performed? \"      Gastric bypass and a G-Tube was placed for feeding as needed. 4. DATE of SURGERY: \"When was surgery performed? \"      Sept 15, 2021  5. ANESTHESIA: \" What type of anesthesia did you have? \" (e.g., general, spinal, epidural, local)      Unknown to patient  6. PAIN: \"Is there any pain? \" If so, ask: \"How bad is it? \"  (Scale 1-10; or mild, moderate, severe)     Denies pain  7. FEVER: \"Do you have a fever? \" If so, ask: \"What is your temperature, how was it measured, and when did it start? \"      Denies  8. VOMITING: \"Is there any vomiting? \" If yes, ask: \"How many times? \"      Denies  9. BLEEDING: \"Is there any bleeding? \" If so, ask: \"How much? \" and \"Where? \"      Bleeding is only inside the G-Tube  10. OTHER SYMPTOMS: \"Do you have any other symptoms? \" (e.g., drainage from wound, painful urination, constipation)        A little bit drainage on the G-Tube incision site.     Protocols used: POST-OP SYMPTOMS AND QUESTIONS-[facility]

## 2021-10-03 NOTE — PROGRESS NOTES
[facility]  [facility]  [address] [place] [address] [place]  [address] [place] [id number]  Dept: [phone]    SURGICAL WEIGHT LOSS MANAGEMENT PROGRAM  PROGRESS NOTE     CC: Weight Loss    Patient: Diane Rendon      Service Date: 9/30/2021  Visit:   2 week    Medical Record #: [id number]  Date of Surgery:   9/15/2021    Reason for Visit: Routine Post-Operative:  [] New Problem /   [] FU of existing problem    Patient here for 2 week visit after bypass. No nausea, vomiting. GERD resolved. Tolerating fulls. Had a BM. No fevers    Height: 5' 7\" (1.702 m)  Highest Weight:   297lbs    Current Visit Weight Information  Weight: 273 lb (123.8 kg)   BMI: Body mass index is 42.76 kg/m². Weight loss since surgery:     26    1 wk - D/C'd home on VTE Prohylaxis? [x] Yes   [] No   On VTE Proph as directed? [x] Yes   [] No    Liver pathology:    [] NA    [] No Gross path    [] Liver Steatosis   [x] Discussed w/ pt   [] Referred to GI    Exercising?    [] Yes    [x] No     Review of Systems:     General  Negative for: [] Weight Change   [x] Fatigue   [x] Fevers & Chills    [] Appetite change [] Other:    Positive for: [x] Weight Change   [] Fatigue   [] Fevers & Chills    [] Appetite change [] Other:   Cardiac  Negative for: [x] Chest Pain   [x] Difficulty Breathing   [] Leg Cramps [x] Edema of Lower Extremeties    [] Left   [] Right      Positive for: [] Chest Pain   [] Difficulty Breathing   [] Leg Cramps [] Edema of Lower Extremeties    [] Left   [] Right   Pulmonary  Negative for: [x] Shortness of Breath [] Wheeze [] Cough  [x] Calf Pain     Positive for: [] Shortness of Breath [] Wheeze [] Cough  [] Calf Pain   Gastro-Intestinal Negative for: [x] Heartburn   [x] Reflux   [x] Dysphagia   [x] Melena   [] BRBPR  [x] Vomiting   [x] Abdominal Pain   [x] Diarrhea     [x] Constipation  [] Other:     Positive for: [] Heartburn   [] Reflux   [] Dysphagia   [] Melena   [] BRBPR  [] Vomiting   [] Abdominal Pain   [] Diarrhea     [] Constipation  [] Other:    Muskuloskeletal Negative for: [] Joint pain   [] Back pain   [] Knee Pain   [] Muscle weakness [x] Hernia   [x] Edema [] Other:     Positive for: [] Joint pain   [] Back pain   [] Knee Pain   [] Muscle weakness [] Hernia   [] Edema [] Other:    Neurologic Negative for: [x] Syncope   [x] Insomnia   [] Being treated for depression  [] Other:     Positive for: [] Syncope   [] Insomnia   [] Being treated for depression  [] Other:    Skin Negative for: [x] Wound:   [] Open   [] Draining   [] Incisional     [x] Rash   [] Hair Loss  [] Other:     Positive for: [] Wound:   [] Open   [] Draining    [] Incisional  [] Rash   [] Hair Loss  [] Other:          Physical Assessment:     /60 (Site: Right Upper Arm, Position: Sitting, Cuff Size: Large Adult)   Pulse 80   Ht 5' 7\" (1.702 m)   Wt 273 lb (123.8 kg)   LMP 09/06/2021 (Exact Date)   BMI 42.76 kg/m²   General Negative for:  [x] Lapses in memory   [x] Unusual Stress   [] Diffic Concen [] Unable to sleep   [] Eating in response to stress   [] Other:    Positive for:  [] Lapses in memory   [] Unusual Stress   [] Diffic Concen [] Unable to sleep   [] Eating in response to stress   [] Other:   Cardio-Pulmonary   [x] Heart RRR   [x] No murmurs   [] Pulses nl x4 extrem    [x] Good inspiratory effort   [x] No wheezing     [x] Lungs clear to auscultation bilaterally    [] Other:    Gastro-Intestinal   [x] Abd S/NT/ND/Benign   [x] No abdominal mass/hernia    [x] No Splenomegaly    [] Other:    Muskuloskeletal   [x] Good muscle strength x4 extremities   [x] nl gait and ambul    [x] Nl ROM x4 extremities    [] Other:    Neurologic   [x] Alert and oriented x3    [] Other:   Skin   [x] Intact w/ no open wounds   [x] Incisions C/D/I    [] Steri strips removed   [x] No drainage or Infection    [] Other:      Assessment & Plan:      1.  Aneurysm, splenic artery (HCC)              [x] Advance Diet    [x] Daily protein (65-75gm/day)   [x] Take Vitamins   [x] Attend Support Group    [x] Exercise Regularly     [x] Use contraception:    [] NA    [] s/p Hysterectomy   [] s/p Tubal ligation   [] Other:     Ambulation    CT reviewed, shows splenic artery aneurysm, will send to IR for coil    PPI for 6 months    Sutures removed    Follow up 1 week    Follow up: No follow-ups on file. Orders placed this encounter:   Orders Placed This Encounter   Procedures    IR TRANSCATH EMBOLIZATION       New Prescriptions:   No orders of the defined types were placed in this encounter. Electronically signed by Damon Taamyo DO on 10/3/2021 at 12:34 PM    Please note that this chart was generated using voice recognition Dragon dictation software. Although every effort was made to ensure the accuracy of this automated transcription, some errors in transcription may have occurred.

## 2021-10-04 NOTE — TELEPHONE ENCOUNTER
Patient states that the g-tube got caught on her pants and was accidentally pulled out, she said the balloon was deflated and had a little drainage from the opening, says she is doing fine , no pain or bleeding at this time

## 2021-10-05 DIAGNOSIS — I72.8 ANEURYSM OF SPLENIC ARTERY (HCC): Primary | ICD-10-CM

## 2021-10-05 NOTE — TELEPHONE ENCOUNTER
Called patient , patient doing well, eating and drinking without difficulty, wound where gtube was is healing, having bowel movements of normal color

## 2021-10-14 ENCOUNTER — HOSPITAL ENCOUNTER (OUTPATIENT)
Dept: INTERVENTIONAL RADIOLOGY/VASCULAR | Age: 31
Discharge: HOME | End: 2021-10-16
Payer: MEDICARE

## 2021-10-14 DIAGNOSIS — I72.8 ANEURYSM OF SPLENIC ARTERY (HCC): ICD-10-CM

## 2021-10-22 NOTE — RESULT ENCOUNTER NOTE
Called patient on 10/5, tube had been out for 4 days. No abdominal pain, nausea, fevers/chills.   She declined further evaluation, risks discussed

## 2021-10-28 ENCOUNTER — OFFICE VISIT (OUTPATIENT)
Dept: BARIATRICS/WEIGHT MGMT | Age: 31
End: 2021-10-28

## 2021-10-28 VITALS
BODY MASS INDEX: 41.28 KG/M2 | WEIGHT: 263 LBS | SYSTOLIC BLOOD PRESSURE: 110 MMHG | DIASTOLIC BLOOD PRESSURE: 70 MMHG | HEART RATE: 70 BPM | HEIGHT: 67 IN

## 2021-10-28 DIAGNOSIS — Z98.84 S/P BARIATRIC SURGERY: Primary | ICD-10-CM

## 2021-10-28 PROCEDURE — 99024 POSTOP FOLLOW-UP VISIT: CPT | Performed by: SURGERY

## 2021-10-28 NOTE — PROGRESS NOTES
Medical Nutrition Therapy  Metabolic and Bariatric surgery  1 month after surgery follow up note               Vitals:   Vitals:    10/28/21 1533   BP: 110/70   Site: Right Upper Arm   Position: Sitting   Cuff Size: Large Adult   Pulse: 70   Weight: 263 lb (119.3 kg)   Height: 5' 7\" (1.702 m)      Body mass index is 41.19 kg/m². Labs reviewed:     Multivitamin/mineral intake: daily  Calcium intake: daily   Other:            Nutrition Assessment:   PES: Inadequate food and beverage intake r/t WLS as evidenced by loss of excess body weight lost 21 lbs over 1 mo.       Goals   60-80gm of protein  48-64oz of fluid     [x] met     []  Not met        Plan:  Questions answered re diet advancement and tolerance  F/u 3 months after surgery         Woody Lombard, MS, RD, LD

## 2021-10-31 NOTE — PROGRESS NOTES
[facility]  [facility]  [address] [place] [address] [place]  [address] [facility] [address]  Dept: [phone]    SURGICAL WEIGHT LOSS MANAGEMENT PROGRAM  PROGRESS NOTE     CC: Weight Loss    Patient: Stefan Tatum      Service Date: 10/28/2021  Visit:   1 month   Medical Record #: [id number]  Date of Surgery:   9/15/2021    Reason for Visit: Routine Post-Operative:  [] New Problem /   [] FU of existing problem    Patient here for 21 month visit after bypass. No nausea, vomiting. GERD resolved. Tolerating regular foods. Having stools. Seeing IR    Height: 5' 7\" (1.702 m)  Highest Weight:   297lbs    Current Visit Weight Information  Weight: 263 lb (119.3 kg)   BMI: Body mass index is 41.19 kg/m². Weight loss since surgery:     34    1 wk - D/C'd home on VTE Prohylaxis? [x] Yes   [] No   On VTE Proph as directed? [x] Yes   [] No    Liver pathology:    [] NA    [] No Gross path    [] Liver Steatosis   [x] Discussed w/ pt   [] Referred to GI    Exercising?    [] Yes    [x] No     Review of Systems:     General  Negative for: [] Weight Change   [x] Fatigue   [x] Fevers & Chills    [] Appetite change [] Other:    Positive for: [x] Weight Change   [] Fatigue   [] Fevers & Chills    [] Appetite change [] Other:   Cardiac  Negative for: [x] Chest Pain   [x] Difficulty Breathing   [] Leg Cramps [x] Edema of Lower Extremeties    [] Left   [] Right      Positive for: [] Chest Pain   [] Difficulty Breathing   [] Leg Cramps [] Edema of Lower Extremeties    [] Left   [] Right   Pulmonary  Negative for: [x] Shortness of Breath [] Wheeze [] Cough  [x] Calf Pain     Positive for: [] Shortness of Breath [] Wheeze [] Cough  [] Calf Pain   Gastro-Intestinal Negative for: [x] Heartburn   [x] Reflux   [x] Dysphagia   [x] Melena   [] BRBPR  [x] Vomiting   [x] Abdominal Pain   [x] Diarrhea     [x] Constipation  [] Other:     Positive for: [] Heartburn   [] Reflux   [] Dysphagia   [] Melena   [] BRBPR  [] Vomiting [] Abdominal Pain   [] Diarrhea     [] Constipation  [] Other:    Muskuloskeletal Negative for: [] Joint pain   [] Back pain   [] Knee Pain   [] Muscle weakness [x] Hernia   [x] Edema [] Other:     Positive for: [] Joint pain   [] Back pain   [] Knee Pain   [] Muscle weakness [] Hernia   [] Edema [] Other:    Neurologic Negative for: [x] Syncope   [x] Insomnia   [] Being treated for depression  [] Other:     Positive for: [] Syncope   [] Insomnia   [] Being treated for depression  [] Other:    Skin Negative for: [x] Wound:   [] Open   [] Draining   [] Incisional     [x] Rash   [] Hair Loss  [] Other:     Positive for: [] Wound:   [] Open   [] Draining    [] Incisional  [] Rash   [] Hair Loss  [] Other:          Physical Assessment:     /70 (Site: Right Upper Arm, Position: Sitting, Cuff Size: Large Adult)   Pulse 70   Ht 5' 7\" (1.702 m)   Wt 263 lb (119.3 kg)   BMI 41.19 kg/m²   General Negative for:  [x] Lapses in memory   [x] Unusual Stress   [] Diffic Concen [] Unable to sleep   [] Eating in response to stress   [] Other:    Positive for:  [] Lapses in memory   [] Unusual Stress   [] Diffic Concen [] Unable to sleep   [] Eating in response to stress   [] Other:   Cardio-Pulmonary   [x] Heart RRR   [x] No murmurs   [] Pulses nl x4 extrem    [x] Good inspiratory effort   [x] No wheezing     [x] Lungs clear to auscultation bilaterally    [] Other:    Gastro-Intestinal   [x] Abd S/NT/ND/Benign   [x] No abdominal mass/hernia    [x] No Splenomegaly    [] Other:    Muskuloskeletal   [x] Good muscle strength x4 extremities   [x] nl gait and ambul    [x] Nl ROM x4 extremities    [] Other:    Neurologic   [x] Alert and oriented x3    [] Other:   Skin   [x] Intact w/ no open wounds   [x] Incisions C/D/I    [] Steri strips removed   [x] No drainage or Infection    [] Other:      Assessment & Plan:      1.  S/P bariatric surgery              [x] Advance Diet    [x] Daily protein (65-75gm/day)   [x] Take Vitamins [x] Attend Support Group    [x] Exercise Regularly     [x] Use contraception:    [] NA    [] s/p Hysterectomy   [] s/p Tubal ligation   [] Other:     PPI    Exercise daily    Advance diet    She is follow up with IR for the splenic aneurysm    Follow up: No follow-ups on file. Orders placed this encounter:   No orders of the defined types were placed in this encounter. New Prescriptions:   No orders of the defined types were placed in this encounter. Electronically signed by Kylah Cavanaugh DO on 10/30/2021 at 8:44 PM    Please note that this chart was generated using voice recognition Dragon dictation software. Although every effort was made to ensure the accuracy of this automated transcription, some errors in transcription may have occurred.

## 2021-11-17 ENCOUNTER — PREP FOR PROCEDURE (OUTPATIENT)
Dept: GENERAL RADIOLOGY | Age: 31
End: 2021-11-17

## 2021-11-17 RX ORDER — SODIUM CHLORIDE 9 MG/ML
INJECTION, SOLUTION INTRAVENOUS CONTINUOUS
Status: CANCELLED | OUTPATIENT
Start: 2021-11-17

## 2021-11-18 ENCOUNTER — HOSPITAL ENCOUNTER (OUTPATIENT)
Dept: INTERVENTIONAL RADIOLOGY/VASCULAR | Age: 31
Discharge: HOME | End: 2021-11-20
Payer: MEDICARE

## 2021-11-18 VITALS
HEART RATE: 74 BPM | DIASTOLIC BLOOD PRESSURE: 82 MMHG | OXYGEN SATURATION: 99 % | TEMPERATURE: 97.7 F | WEIGHT: 260 LBS | SYSTOLIC BLOOD PRESSURE: 108 MMHG | HEIGHT: 67 IN | RESPIRATION RATE: 16 BRPM | BODY MASS INDEX: 40.81 KG/M2

## 2021-11-18 DIAGNOSIS — I72.8 ANEURYSM, SPLENIC ARTERY (HCC): ICD-10-CM

## 2021-11-18 LAB
INR BLD: 1
PARTIAL THROMBOPLASTIN TIME: 25.6 SEC (ref 20.5–30.5)
PLATELET # BLD: 222 K/UL (ref 138–453)
PROTHROMBIN TIME: 10.4 SEC (ref 9.1–12.3)

## 2021-11-18 PROCEDURE — C1894 INTRO/SHEATH, NON-LASER: HCPCS

## 2021-11-18 PROCEDURE — 6360000002 HC RX W HCPCS: Performed by: RADIOLOGY

## 2021-11-18 PROCEDURE — 75774 ARTERY X-RAY EACH VESSEL: CPT | Performed by: RADIOLOGY

## 2021-11-18 PROCEDURE — 2709999900 HC NON-CHARGEABLE SUPPLY

## 2021-11-18 PROCEDURE — 75726 ARTERY X-RAYS ABDOMEN: CPT | Performed by: RADIOLOGY

## 2021-11-18 PROCEDURE — 37242 VASC EMBOLIZE/OCCLUDE ARTERY: CPT | Performed by: RADIOLOGY

## 2021-11-18 PROCEDURE — 36246 INS CATH ABD/L-EXT ART 2ND: CPT | Performed by: RADIOLOGY

## 2021-11-18 PROCEDURE — C1887 CATHETER, GUIDING: HCPCS

## 2021-11-18 PROCEDURE — C1769 GUIDE WIRE: HCPCS

## 2021-11-18 PROCEDURE — 2580000003 HC RX 258: Performed by: PHYSICIAN ASSISTANT

## 2021-11-18 PROCEDURE — 6360000004 HC RX CONTRAST MEDICATION: Performed by: SURGERY

## 2021-11-18 PROCEDURE — 2720000010 HC SURG SUPPLY STERILE

## 2021-11-18 PROCEDURE — 85049 AUTOMATED PLATELET COUNT: CPT

## 2021-11-18 PROCEDURE — 7100000010 HC PHASE II RECOVERY - FIRST 15 MIN

## 2021-11-18 PROCEDURE — C1889 IMPLANT/INSERT DEVICE, NOC: HCPCS

## 2021-11-18 PROCEDURE — 85610 PROTHROMBIN TIME: CPT

## 2021-11-18 PROCEDURE — 85730 THROMBOPLASTIN TIME PARTIAL: CPT

## 2021-11-18 PROCEDURE — 7100000011 HC PHASE II RECOVERY - ADDTL 15 MIN

## 2021-11-18 PROCEDURE — C1760 CLOSURE DEV, VASC: HCPCS

## 2021-11-18 RX ORDER — M-VIT,TX,IRON,MINS/CALC/FOLIC 27MG-0.4MG
1 TABLET ORAL DAILY
COMMUNITY

## 2021-11-18 RX ORDER — FENTANYL CITRATE 50 UG/ML
INJECTION, SOLUTION INTRAMUSCULAR; INTRAVENOUS
Status: COMPLETED | OUTPATIENT
Start: 2021-11-18 | End: 2021-11-18

## 2021-11-18 RX ORDER — MIDAZOLAM HYDROCHLORIDE 2 MG/2ML
INJECTION, SOLUTION INTRAMUSCULAR; INTRAVENOUS
Status: COMPLETED | OUTPATIENT
Start: 2021-11-18 | End: 2021-11-18

## 2021-11-18 RX ORDER — SODIUM CHLORIDE 9 MG/ML
INJECTION, SOLUTION INTRAVENOUS CONTINUOUS
Status: DISCONTINUED | OUTPATIENT
Start: 2021-11-18 | End: 2021-11-21 | Stop reason: HOSPADM

## 2021-11-18 RX ORDER — ACETAMINOPHEN 325 MG/1
650 TABLET ORAL EVERY 4 HOURS PRN
Status: DISCONTINUED | OUTPATIENT
Start: 2021-11-18 | End: 2021-11-21 | Stop reason: HOSPADM

## 2021-11-18 RX ADMIN — MIDAZOLAM HYDROCHLORIDE 0.5 MG: 1 INJECTION, SOLUTION INTRAMUSCULAR; INTRAVENOUS at 10:18

## 2021-11-18 RX ADMIN — MIDAZOLAM HYDROCHLORIDE 0.5 MG: 1 INJECTION, SOLUTION INTRAMUSCULAR; INTRAVENOUS at 09:54

## 2021-11-18 RX ADMIN — SODIUM CHLORIDE: 9 INJECTION, SOLUTION INTRAVENOUS at 08:50

## 2021-11-18 RX ADMIN — FENTANYL CITRATE 50 MCG: 50 INJECTION, SOLUTION INTRAMUSCULAR; INTRAVENOUS at 09:46

## 2021-11-18 RX ADMIN — MIDAZOLAM HYDROCHLORIDE 0.25 MG: 1 INJECTION, SOLUTION INTRAMUSCULAR; INTRAVENOUS at 10:46

## 2021-11-18 RX ADMIN — FENTANYL CITRATE 50 MCG: 50 INJECTION, SOLUTION INTRAMUSCULAR; INTRAVENOUS at 10:18

## 2021-11-18 RX ADMIN — MIDAZOLAM HYDROCHLORIDE 0.25 MG: 1 INJECTION, SOLUTION INTRAMUSCULAR; INTRAVENOUS at 10:34

## 2021-11-18 RX ADMIN — IOPAMIDOL 58 ML: 755 INJECTION, SOLUTION INTRAVENOUS at 11:27

## 2021-11-18 RX ADMIN — FENTANYL CITRATE 25 MCG: 50 INJECTION, SOLUTION INTRAMUSCULAR; INTRAVENOUS at 10:35

## 2021-11-18 RX ADMIN — MIDAZOLAM HYDROCHLORIDE 0.5 MG: 1 INJECTION, SOLUTION INTRAMUSCULAR; INTRAVENOUS at 09:47

## 2021-11-18 RX ADMIN — FENTANYL CITRATE 25 MCG: 50 INJECTION, SOLUTION INTRAMUSCULAR; INTRAVENOUS at 10:47

## 2021-11-18 RX ADMIN — FENTANYL CITRATE 50 MCG: 50 INJECTION, SOLUTION INTRAMUSCULAR; INTRAVENOUS at 09:54

## 2021-11-18 ASSESSMENT — PAIN SCALES - GENERAL
PAINLEVEL_OUTOF10: 0

## 2021-11-18 ASSESSMENT — PAIN - FUNCTIONAL ASSESSMENT
PAIN_FUNCTIONAL_ASSESSMENT: 0-10

## 2021-11-18 NOTE — PROGRESS NOTES
Discharge instructions reviewed with pt. Understanding verbalized. Verbal handoff report given Solomon Padron RN.

## 2021-11-18 NOTE — PRE SEDATION
Sedation Pre-Procedure Note    Patient Name: Gera Porter   YOB: 1990  Room/Bed: Room/bed info not found  Medical Record Number: [id number]  Date: 11/18/2021   Time: 9:22 AM       Indication:  Splenic artery aneurysm embolization    Consent: I have discussed with the patient and/or the patient representative the indication, alternatives, and the possible risks and/or complications of the planned procedure and the anesthesia methods. The patient and/or patient representative appear to understand and agree to proceed. Vital Signs:   Vitals:    11/18/21 0821   BP: 113/83   Pulse: 81   Resp: 18   Temp: 97.6 °F (36.4 °C)   SpO2: 98%       Past Medical History:   has a past medical history of Anemia, Aneurysm, splenic artery (HCC), Anxiety, Cholelithiasis, GERD (gastroesophageal reflux disease), History of asthma, Kidney stones, Migraine, Obesity, PCOS (polycystic ovarian syndrome), PONV (postoperative nausea and vomiting), S/P cholecystectomy, S/P laparoscopic sleeve gastrectomy, Sleep apnea, and Wellness examination. Past Surgical History:   has a past surgical history that includes gastrectomy (1/5/15); hernia repair; Cholecystectomy, laparoscopic (02/29/16); Tubal ligation; Roderick-en-Y Gastric Bypass (09/15/2021); and Roderick-en-Y Gastric Bypass (N/A, 9/15/2021). Medications:   Scheduled Meds:   Continuous Infusions:    sodium chloride 20 mL/hr at 11/18/21 0850     PRN Meds:   Home Meds:   Prior to Admission medications    Medication Sig Start Date End Date Taking?  Authorizing Provider   Multiple Vitamins-Minerals (THERAPEUTIC MULTIVITAMIN-MINERALS) tablet Take 1 tablet by mouth daily   Yes Historical Provider, MD   FLUoxetine (PROZAC) 20 MG capsule take 1 capsule by mouth once daily 1/22/21  Yes Historical Provider, MD   pantoprazole (PROTONIX) 40 MG tablet Take 1 tablet by mouth 2 times daily 10/9/15  Yes XOCHITL Torres - CNP     Coumadin Use Last 7 Days:  no  Antiplatelet drug therapy use last 7 days: no  Other anticoagulant use last 7 days: no  Additional Medication Information:  See [facility]      Pre-Sedation Documentation and Exam:   I have reviewed the patient's history and review of systems.     Mallampati Airway Assessment:  Mallampati Class II - (soft palate, fauces & uvula are visible)    Prior History of Anesthesia Complications:   none    ASA Classification:  Class 2 - A normal healthy patient with mild systemic disease    Sedation/ Anesthesia Plan:   intravenous sedation    Medications Planned:   midazolam (Versed) intravenously and fentanyl intravenously    Patient is an appropriate candidate for plan of sedation: yes    Electronically signed by GEOVANNA Ortiz on 11/18/2021 at 9:22 AM

## 2021-11-18 NOTE — PROGRESS NOTES
Dr. Arjun Antonio notified with pt update. Pt without complaints of discomfort. No change in right groin or bilateral pedal pulses. States she can sit up now and discharge to home in 30 minutes if no bleeding.

## 2021-11-18 NOTE — SEDATION DOCUMENTATION
Multiple coils deployed to splenic aneurysm. Access removed and pressure at site per Dr. Neil Barcenas.

## 2021-11-18 NOTE — H&P
History and Physical    Pt Name: Kris Mckinney  MRN: [id number]  YOB: 1990  Date of evaluation: 11/18/2021  Primary Care Physician: Stacey Swan MD    SUBJECTIVE:   History of Chief Complaint:    Kris Mckinney is a 32 y.o. female who is scheduled today for IR [facility]. Patient reports around the time of her sleeve to bypass revision in September of this year, she was discovered to have an aneurysm to her splenic artery. Patient denies any symptoms, including shortness of breath, activity intolerance, or any abdominal pain or discomfort. Allergies  has No Known Allergies. Medications  Prior to Admission medications    Medication Sig Start Date End Date Taking? Authorizing Provider   Multiple Vitamins-Minerals (THERAPEUTIC MULTIVITAMIN-MINERALS) tablet Take 1 tablet by mouth daily   Yes Historical Provider, MD   FLUoxetine (PROZAC) 20 MG capsule take 1 capsule by mouth once daily 1/22/21  Yes Historical Provider, MD   pantoprazole (PROTONIX) 40 MG tablet Take 1 tablet by mouth 2 times daily 10/9/15  Yes XOCHITL Phan - CNP     Past Medical History    has a past medical history of Anemia, Anxiety, Cholelithiasis, GERD (gastroesophageal reflux disease), History of asthma, Kidney stones, Migraine, Obesity, PCOS (polycystic ovarian syndrome), PONV (postoperative nausea and vomiting), S/P cholecystectomy, S/P laparoscopic sleeve gastrectomy, Sleep apnea, and Wellness examination. Past Surgical History   has a past surgical history that includes gastrectomy (1/5/15); hernia repair; Cholecystectomy, laparoscopic (02/29/16); Tubal ligation; Roderick-en-Y Gastric Bypass (09/15/2021); and Roderick-en-Y Gastric Bypass (N/A, 9/15/2021). Social History   reports that she has never smoked. She has never used smokeless tobacco.   reports previous alcohol use. reports no history of drug use.   Marital Status single  Children 2  Occupation registration [employer]  Family History  Family Status Relation Name Status    Mother  Alive    Father  Alive    MGM  Alive    MGF      PGM  Alive    PGF  Alive     family history includes Arthritis in her maternal grandmother; Depression in her father; Diabetes in her father and paternal grandfather; Heart Attack in her father; Heart Disease in her paternal grandfather; High Blood Pressure in her father; High Cholesterol in her father; Prostate Cancer in her maternal grandfather; Stroke in her father and paternal grandmother; Substance Abuse in her mother. Review of Systems:  CONSTITUTIONAL:   negative for fevers, chills, fatigue and malaise    EYES:   negative for double vision, blurred vision and photophobia    HEENT:   negative for tinnitus, epistaxis and sore throat     RESPIRATORY:   negative for cough, shortness of breath, wheezing     CARDIOVASCULAR:   negative for chest pain, palpitations, syncope, edema     GASTROINTESTINAL:   negative for nausea, vomiting     GENITOURINARY:   negative for incontinence     MUSCULOSKELETAL:   negative for neck or back pain     NEUROLOGICAL:   Negative for weakness and tingling  negative for headaches and dizziness     PSYCHIATRIC:   negative for anxiety       OBJECTIVE:   VITALS:  height is 5' 7\" (1.702 m) and weight is 260 lb (117.9 kg). Her infrared temperature is 97.6 °F (36.4 °C). Her blood pressure is 113/83 and her pulse is 81. Her respiration is 18 and oxygen saturation is 98%. CONSTITUTIONAL:alert & oriented x 3, no acute distress. Calm and pleasant. SKIN:  Warm and dry, no rashes to exposed areas of skin. HEAD:  Normocephalic, atraumatic. EYES: PERRL. EOMs intact. EARS:  Intact and equal bilaterally. No edema or thickening, without lumps, lesions, or discharge. Hearing grossly WNL. NOSE:  Nares patent. No rhinorrhea. MOUTH/THROAT:  Mucous membranes pink and moist, uvula midline, teeth appear to be intact. NECK: Supple, no lymphadenopathy.   LUNGS: Respirations even and non-labored. Clear to auscultation bilaterally, no wheezes, rales, or rhonchi. CARDIOVASCULAR: Regular rate and rhythm, no murmurs/rubs/gallops. ABDOMEN: soft, non-tender and non-distended, bowel sounds active x 4. EXTREMITIES: No edema to bilateral lower extremities. Varicosities to bilateral lower extremities. NEUROLOGIC: CN II-XII are grossly intact. Gait not assessed. IMPRESSIONS:   Aneurysm, splenic artery  PLANS:   IR TRANSCATH EMBOLIZATION.     XOCHITL Saunders CNP   Electronically signed 11/18/2021 at 8:37 AM

## 2021-11-18 NOTE — BRIEF OP NOTE
Brief Postoperative Note    Samaria Williamson  YOB: 1990  [id number]    Pre-operative Diagnosis: Splenic artery aneurysms    Post-operative Diagnosis: Same    Procedure: Splenic artery aneurysm coil embolization    Medications Given: fentanyl and versed    Anesthesia: Local    Surgeons/Assistants: Mustapha Heard MD    Estimated Blood Loss: minimal    Complications: none    Specimens: were not obtained    Findings: Successful coil embolization largest of 2 saccular splenic artery aneurysms with multiple detachable coils reaching packing density of 22% (target 24%) with complete exclusion of the aneurysm and maintenance of good flow in the remainder splenic artery, and no immediate complication. Second distal saccular aneurysm 1cm. Follow-up CTA suggested in 3 mos. MRA may be considered for subsequent follow-up at 6 mos and 18 mos. To reduce radiation dose.       Electronically signed by Mustapha Heard MD on 11/18/2021 at 11:25 AM

## 2021-11-18 NOTE — PROGRESS NOTES
Patient right groin dressing clean, dry and intact with no signs of bruising noted prior to and after the patient ambulating to the restroom. Patient brought by wheelchair to her vehicle.

## 2022-01-06 ENCOUNTER — OFFICE VISIT (OUTPATIENT)
Dept: BARIATRICS/WEIGHT MGMT | Age: 32
End: 2022-01-06
Payer: MEDICARE

## 2022-01-06 VITALS
SYSTOLIC BLOOD PRESSURE: 104 MMHG | DIASTOLIC BLOOD PRESSURE: 65 MMHG | HEART RATE: 71 BPM | HEIGHT: 66 IN | WEIGHT: 249 LBS | BODY MASS INDEX: 40.02 KG/M2 | RESPIRATION RATE: 20 BRPM

## 2022-01-06 DIAGNOSIS — E66.01 MORBID OBESITY (HCC): ICD-10-CM

## 2022-01-06 DIAGNOSIS — K21.9 GASTROESOPHAGEAL REFLUX DISEASE, UNSPECIFIED WHETHER ESOPHAGITIS PRESENT: Primary | ICD-10-CM

## 2022-01-06 DIAGNOSIS — D50.9 IRON DEFICIENCY ANEMIA, UNSPECIFIED IRON DEFICIENCY ANEMIA TYPE: ICD-10-CM

## 2022-01-06 DIAGNOSIS — E28.2 PCOS (POLYCYSTIC OVARIAN SYNDROME): ICD-10-CM

## 2022-01-06 DIAGNOSIS — Z98.84 S/P GASTRIC BYPASS: ICD-10-CM

## 2022-01-06 DIAGNOSIS — Z98.84 S/P LAPAROSCOPIC SLEEVE GASTRECTOMY: ICD-10-CM

## 2022-01-06 PROCEDURE — G8427 DOCREV CUR MEDS BY ELIG CLIN: HCPCS | Performed by: NURSE PRACTITIONER

## 2022-01-06 PROCEDURE — 99213 OFFICE O/P EST LOW 20 MIN: CPT | Performed by: NURSE PRACTITIONER

## 2022-01-06 PROCEDURE — G8484 FLU IMMUNIZE NO ADMIN: HCPCS | Performed by: NURSE PRACTITIONER

## 2022-01-06 PROCEDURE — G8417 CALC BMI ABV UP PARAM F/U: HCPCS | Performed by: NURSE PRACTITIONER

## 2022-01-06 PROCEDURE — 1036F TOBACCO NON-USER: CPT | Performed by: NURSE PRACTITIONER

## 2022-01-06 NOTE — PROGRESS NOTES
Medical Nutrition Therapy  Metabolic and Bariatric surgery  3 month follow up        Pt reports:         Vitals:   Vitals:    01/06/22 1058   BP: 104/65   Site: Left Upper Arm   Position: Sitting   Cuff Size: Large Adult   Pulse: 71   Resp: 20   Weight: 249 lb (112.9 kg)   Height: 5' 6\" (1.676 m)      Body mass index is 40.19 kg/m². Labs reviewed:     Multivitamin/mineral intake:2 daily  Calcium intake:   2 daily  Other:            Nutrition Assessment:   PES: Inadequate food and beverage intake r/t WLS as evidenced by loss of excess body weight 35lb. Goals   60-80gm of protein  48-64oz of fluid  Vitamin adherance  Basic adherance to WLS behavious and this document has been scanned into the chart.        [x] met     []  Not met        Plan:   F/u 6 months after surgery         Soraida Walsh RD, LD

## 2022-01-06 NOTE — PROGRESS NOTES
Post-op Bariatric Surgery Note    Subjective     Patient is 3 months s/p laparoscopic revision of sleeve to bypass, repair of recurrent hiatal hernia, and small bowel resection, down 35 lbs. Overall, doing well. Incisions well healed. Consistent use of MVI and calcium. Physical activity includes walking. No pain or other specific problems or concerns. Allergies:  No Known Allergies    Past Medical History:     Past Medical History:   Diagnosis Date    Anemia     Aneurysm, splenic artery (HCC)     Anxiety     Cholelithiasis     GERD (gastroesophageal reflux disease)     History of asthma     sports induced    Kidney stones     Dr. Minal Vazquez    Migraine     Obesity     PCOS (polycystic ovarian syndrome)     PONV (postoperative nausea and vomiting)     S/P cholecystectomy 02/29/2016    Dr. Akil Tipton S/P laparoscopic sleeve gastrectomy 01/05/2015    start wt = 308.6lb, Dr. Serenity George    Sleep apnea     no machine/resolved after sleeve gastrectomy    Wellness examination     PCP Gama Ball MD/ e[facility] morales/ last seen 7-2021   . Past Surgical History:  Past Surgical History:   Procedure Laterality Date    CHOLECYSTECTOMY, LAPAROSCOPIC  02/29/16    X I robotic assisted    GASTRECTOMY  1/5/15    sleeve gastrectomy    HERNIA REPAIR      with sleeve surgery.      MAXIMILIANO-EN-Y GASTRIC BYPASS  09/15/2021    EGD, feeding tube insertion    MAXIMILIANO-EN-Y GASTRIC BYPASS N/A 9/15/2021    XI ROBOTIC LAPAROSCOPIC REVISION GASTRIC SLEEVE TO GASTRIC BYPASS MAXIMILIANO-EN-Y, EGD,, LYSIS OF ADHESIONS, REPAIR OF RECURRENT HIATLA HERNIA, ROBOTIC G TUBE INSERTION, SMALL BOWEL RESECTION performed by Jose Aguirre DO at [facility]      2019       Family History:  Family History   Problem Relation Age of Onset    Substance Abuse Mother     Depression Father     Diabetes Father     High Cholesterol Father     Stroke Father     Heart Attack Father     High Blood Pressure Father     Arthritis Maternal Grandmother     Prostate Cancer Maternal Grandfather     Stroke Paternal Grandmother     Diabetes Paternal Grandfather     Heart Disease Paternal Grandfather        Social History:  Social History     Socioeconomic History    Marital status: Single     Spouse name: Not on file    Number of children: 0    Years of education: Not on file    Highest education level: Not on file   Occupational History    Occupation: [employer]   Tobacco Use    Smoking status: Never Smoker    Smokeless tobacco: Never Used   Vaping Use    Vaping Use: Never used   Substance and Sexual Activity    Alcohol use: Not Currently     Comment: social, willing to avoid for at least 6 mon post tania surg    Drug use: No    Sexual activity: Yes     Partners: Male   Other Topics Concern    Not on file   Social History Narrative    Not on file     Social Determinants of Health     Financial Resource Strain:     Difficulty of Paying Living Expenses: Not on file   Food Insecurity:     Worried About [address] in the Last Year: Not on file    [address] in the Last Year: Not on file   Transportation Needs:     Lack of Transportation (Medical): Not on file    Lack of Transportation (Non-Medical):  Not on file   Physical Activity:     Days of Exercise per Week: Not on file    Minutes of Exercise per Session: Not on file   Stress:     Feeling of Stress : Not on file   Social Connections:     Frequency of Communication with Friends and Family: Not on file    Frequency of Social Gatherings with Friends and Family: Not on file    Attends [employer] Services: Not on file    Active Member of Clubs or Organizations: Not on file    Attends Club or Organization Meetings: Not on file    Marital Status: Not on file   Intimate Partner Violence:     Fear of Current or Ex-Partner: Not on file    Emotionally Abused: Not on file    Physically Abused: Not on file    Sexually Abused: Not on file   Housing Stability:     Unable to Pay for Housing in the Last Year: Not on file    Number of Places Lived in the Last Year: Not on file    Unstable Housing in the Last Year: Not on file       Current Medications:  Current Outpatient Medications   Medication Sig Dispense Refill    Multiple Vitamins-Minerals (THERAPEUTIC MULTIVITAMIN-MINERALS) tablet Take 1 tablet by mouth daily      FLUoxetine (PROZAC) 20 MG capsule take 1 capsule by mouth once daily      pantoprazole (PROTONIX) 40 MG tablet Take 1 tablet by mouth 2 times daily 60 tablet 3     No current facility-administered medications for this visit. Vital Signs:  /65 (Site: Left Upper Arm, Position: Sitting, Cuff Size: Large Adult)   Pulse 71   Resp 20   Ht 5' 6\" (1.676 m)   Wt 249 lb (112.9 kg)   LMP 01/02/2022 (Approximate)   BMI 40.19 kg/m²     BMI/Height/Weight:  Body mass index is 40.19 kg/m². Review of Systems - A review of systems was performed. All was negative unless otherwise documented in HPI. Constitutional: Negative for fever, chills and diaphoresis. HENT: Negative for hearing loss and trouble swallowing. Eyes: Negative for photophobia and visual disturbance. Respiratory: Negative for cough, shortness of breath and wheezing. Cardiovascular: Negative for chest pain and palpitations. Gastrointestinal: Negative for nausea, vomiting, abdominal pain, diarrhea, constipation, blood in stool and abdominal distention. Endocrine: Negative for polydipsia, polyphagia and polyuria. Genitourinary: Negative for dysuria, frequency, hematuria and difficulty urinating. Musculoskeletal: Negative for myalgias, joint swelling. Skin: Negative for pallor and rash. Neurological: Negative for dizziness, tremors, light-headedness and headaches. Psychiatric/Behavioral: Negative for sleep disturbance and dysphoric mood. Objective:      Physical Exam   Vital signs reviewed. General: Well-developed and well-nourished.  No acute distress. Skin: Warm, dry and intact. HEENT: Normocephalic. EOMs intact. Conjunctivae normal. Neck supple. Cardiovascular: Normal rate, regular rhythm. Pulmonary/Chest: Normal effort. Lungs clear to auscultation. No rales, rhonchi or wheezing. Abdominal: Positive bowel sounds. Soft, nontender. Nondistended. No rigidity, rebound, or guarding. Musculoskeletal: Movement x4. No edema. Neurological: Gait normal. Alert and oriented to person, place, and time. Psychiatric: Normal mood and affect. Speech and behavior normal. Judgment and thought content normal. Cognition and memory intact. Assessment:       Diagnosis Orders   1. Gastroesophageal reflux disease, unspecified whether esophagitis present  CBC Auto Differential    Comprehensive Metabolic Panel    Hemoglobin A1C    Iron and TIBC    Lipid Panel    Magnesium    PTH, Intact    Zinc    Vitamin D 25 Hydroxy    Vitamin B12 & Folate    Vitamin B1    Vitamin A    TSH without Reflex   2. S/P gastric bypass  CBC Auto Differential    Comprehensive Metabolic Panel    Hemoglobin A1C    Iron and TIBC    Lipid Panel    Magnesium    PTH, Intact    Zinc    Vitamin D 25 Hydroxy    Vitamin B12 & Folate    Vitamin B1    Vitamin A    TSH without Reflex   3. Morbid obesity (HCC)  CBC Auto Differential    Comprehensive Metabolic Panel    Hemoglobin A1C    Iron and TIBC    Lipid Panel    Magnesium    PTH, Intact    Zinc    Vitamin D 25 Hydroxy    Vitamin B12 & Folate    Vitamin B1    Vitamin A    TSH without Reflex   4. PCOS (polycystic ovarian syndrome)  CBC Auto Differential    Comprehensive Metabolic Panel    Hemoglobin A1C    Iron and TIBC    Lipid Panel    Magnesium    PTH, Intact    Zinc    Vitamin D 25 Hydroxy    Vitamin B12 & Folate    Vitamin B1    Vitamin A    TSH without Reflex   5.  S/P laparoscopic sleeve gastrectomy  CBC Auto Differential    Comprehensive Metabolic Panel    Hemoglobin A1C    Iron and TIBC    Lipid Panel    Magnesium    PTH, Intact Zinc    Vitamin D 25 Hydroxy    Vitamin B12 & Folate    Vitamin B1    Vitamin A    TSH without Reflex   6. Iron deficiency anemia, unspecified iron deficiency anemia type  CBC Auto Differential    Comprehensive Metabolic Panel    Hemoglobin A1C    Iron and TIBC    Lipid Panel    Magnesium    PTH, Intact    Zinc    Vitamin D 25 Hydroxy    Vitamin B12 & Folate    Vitamin B1    Vitamin A    TSH without Reflex       Plan:    Dietitian visit today. Patient to continue to increase protein to obtain 60-80g/day, at least 48-64oz of fluid daily, and gradually increase exercise regimen. Follow-up  Return in about 3 months (around 4/6/2022). Orders this encounter:  Orders Placed This Encounter   Procedures    CBC Auto Differential     Standing Status:   Future     Standing Expiration Date:   3/7/2023    Comprehensive Metabolic Panel     Standing Status:   Future     Standing Expiration Date:   3/7/2023    Hemoglobin A1C     Standing Status:   Future     Standing Expiration Date:   3/7/2023    Iron and TIBC     Standing Status:   Future     Standing Expiration Date:   3/7/2023     Order Specific Question:   Is Patient Fasting? Answer:   yes     Order Specific Question:   No of Hours?      Answer:   12    Lipid Panel     Standing Status:   Future     Standing Expiration Date:   3/7/2023     Order Specific Question:   Is Patient Fasting?/# of Hours     Answer:   12    Magnesium     Standing Status:   Future     Standing Expiration Date:   3/7/2023    PTH, Intact     Standing Status:   Future     Standing Expiration Date:   3/7/2023    Zinc     Standing Status:   Future     Standing Expiration Date:   3/7/2023    Vitamin D 25 Hydroxy     Standing Status:   Future     Standing Expiration Date:   3/7/2023    Vitamin B12 & Folate     Standing Status:   Future     Standing Expiration Date:   3/7/2023    Vitamin B1     Standing Status:   Future     Standing Expiration Date:   3/7/2023    Vitamin A     Standing Status:   Future     Standing Expiration Date:   3/7/2023    TSH without Reflex     Standing Status:   Future     Standing Expiration Date:   3/7/2023       Prescriptions this encounter:  No orders of the defined types were placed in this encounter.       Electronically signed by:  Abrahan Peralta CNP

## 2022-01-19 ENCOUNTER — TELEPHONE (OUTPATIENT)
Dept: INTERVENTIONAL RADIOLOGY/VASCULAR | Age: 32
End: 2022-01-19

## 2022-01-19 DIAGNOSIS — I72.8 SPLENIC ARTERY ANEURYSM (HCC): Primary | ICD-10-CM

## 2022-01-19 NOTE — TELEPHONE ENCOUNTER
Per Dr Aris Gurrola patient will need a cta abd 3 months post splenic artery embolization. I attempted to contact the patient to schedule the CTA.   LM

## 2022-02-08 ENCOUNTER — HOSPITAL ENCOUNTER (OUTPATIENT)
Dept: CT IMAGING | Age: 32
Discharge: HOME | End: 2022-02-10
Payer: MEDICARE

## 2022-02-08 DIAGNOSIS — I72.8 SPLENIC ARTERY ANEURYSM (HCC): ICD-10-CM

## 2022-02-08 PROCEDURE — 2580000003 HC RX 258: Performed by: RADIOLOGY

## 2022-02-08 PROCEDURE — 74175 CTA ABDOMEN W/CONTRAST: CPT

## 2022-02-08 PROCEDURE — 6360000004 HC RX CONTRAST MEDICATION: Performed by: RADIOLOGY

## 2022-02-08 RX ORDER — SODIUM CHLORIDE 0.9 % (FLUSH) 0.9 %
10 SYRINGE (ML) INJECTION PRN
Status: DISCONTINUED | OUTPATIENT
Start: 2022-02-08 | End: 2022-02-11 | Stop reason: HOSPADM

## 2022-02-08 RX ORDER — 0.9 % SODIUM CHLORIDE 0.9 %
80 INTRAVENOUS SOLUTION INTRAVENOUS ONCE
Status: COMPLETED | OUTPATIENT
Start: 2022-02-08 | End: 2022-02-08

## 2022-02-08 RX ADMIN — IOPAMIDOL 100 ML: 755 INJECTION, SOLUTION INTRAVENOUS at 11:27

## 2022-02-08 RX ADMIN — SODIUM CHLORIDE 80 ML: 9 INJECTION, SOLUTION INTRAVENOUS at 11:27

## 2022-02-08 RX ADMIN — SODIUM CHLORIDE, PRESERVATIVE FREE 10 ML: 5 INJECTION INTRAVENOUS at 11:27

## 2022-04-07 ENCOUNTER — TELEPHONE (OUTPATIENT)
Dept: BARIATRICS/WEIGHT MGMT | Age: 32
End: 2022-04-07

## 2023-03-24 ENCOUNTER — TELEPHONE (OUTPATIENT)
Dept: BARIATRICS/WEIGHT MGMT | Age: 33
End: 2023-03-24

## 2024-04-03 ENCOUNTER — TELEPHONE (OUTPATIENT)
Dept: BARIATRICS/WEIGHT MGMT | Age: 34
End: 2024-04-03

## 2024-04-03 NOTE — TELEPHONE ENCOUNTER
Left detailed phone message, it's time for the patient's annual post op visit, and left a mychart message also

## (undated) DEVICE — SUTURE MCRYL SZ 4-0 L18IN ABSRB UD L16MM PC-3 3/8 CIR PRIM Y845G

## (undated) DEVICE — COVER LT HNDL BLU PLAS

## (undated) DEVICE — TIP COVER ACCESSORY

## (undated) DEVICE — ARM DRAPE

## (undated) DEVICE — SOLUTION ANTIFOG VIS SYS CLEARIFY LAPSCP

## (undated) DEVICE — STAPLER 60: Brand: SUREFORM

## (undated) DEVICE — COUNTER NDL 40 COUNT HLD 70 FOAM BLK ADH W/ MAG

## (undated) DEVICE — SUTURE NONABSORBABLE MONOFILAMENT 3-0 PS-1 18 IN BLK ETHILON 1663H

## (undated) DEVICE — RESERVOIR,SUCTION,100CC,SILICONE: Brand: MEDLINE

## (undated) DEVICE — STAPLER INT L L25MM DIA5MM GI WHT TI BARIATRIC CIR CUT 2

## (undated) DEVICE — REDUCER: Brand: ENDOWRIST

## (undated) DEVICE — TOTAL TRAY, 16FR 10ML SIL FOLEY, URN: Brand: MEDLINE

## (undated) DEVICE — SUTURE ABSRB BRAID COAT VLT SH 3-0 27IN VCRL J311H

## (undated) DEVICE — SUTURE SZ 0 27IN 5/8 CIR UR-6  TAPER PT VIOLET ABSRB VICRYL J603H

## (undated) DEVICE — STAPLER 60 RELOAD WHITE: Brand: SUREFORM

## (undated) DEVICE — SUTURE PERMAHAND SZ 0 L30IN NONABSORBABLE BLK L26MM SH 1/2 K834H

## (undated) DEVICE — GLOVE ORANGE PI 7 1/2   MSG9075

## (undated) DEVICE — Device

## (undated) DEVICE — TROCAR: Brand: KII FIOS FIRST ENTRY

## (undated) DEVICE — SEAL

## (undated) DEVICE — PLUMEPORT SEO LAPAROSCOPIC SMOKE FILTRATION DEVICE: Brand: PLUMEPORT

## (undated) DEVICE — TUBE GASTROSTMY 22FR MED GRD SIL FEED GAM RECESS DST TIP

## (undated) DEVICE — APPLIER CLP M L L11.4IN DIA10MM ENDOSCP ROT MULT FOR LIG

## (undated) DEVICE — INSUFFLATION TUBING SET WITH FILTER, FUNNEL CONNECTOR AND LUER LOCK: Brand: JOSNOE MEDICAL INC

## (undated) DEVICE — APPLICATOR MEDICATED 26 CC SOLUTION HI LT ORNG CHLORAPREP

## (undated) DEVICE — GARMENT,MEDLINE,DVT,INT,CALF,LG, GEN2: Brand: MEDLINE

## (undated) DEVICE — PROTECTOR ULN NRV PUR FOAM HK LOOP STRP ANATOMICALLY

## (undated) DEVICE — SHEARS ENDOSCP L36CM DIA5MM ULTRASONIC CRV TIP ADAPTIVE

## (undated) DEVICE — STAPLER INT W25MM WHT TRNSOR CIR ANVIL W/ ADVANCING PROX

## (undated) DEVICE — STAPLER 60 RELOAD GREEN: Brand: SUREFORM

## (undated) DEVICE — BLADELESS OBTURATOR: Brand: WECK VISTA

## (undated) DEVICE — VESSEL SEALER EXTEND: Brand: ENDOWRIST

## (undated) DEVICE — GLOVE ORANGE PI 7   MSG9070

## (undated) DEVICE — DRESSING TRNSPAR W5XL4.5IN FLM SHT SEMIPERMEABLE WIND

## (undated) DEVICE — SUTURE ETHBND EXCEL SZ 0 L30IN NONABSORBABLE GRN L26MM CT-2 X412H

## (undated) DEVICE — CANNULA SEAL

## (undated) DEVICE — DRAIN SURG 19FR 100% SIL RADPQ RND CHN FULL FLUT

## (undated) DEVICE — ADHESIVE SKIN CLOSURE TOP 36 CC HI VISC DERMBND MINI

## (undated) DEVICE — SUTURE ETHBND EXCEL SZ 3-0 L30IN NONABSORBABLE GRN L26MM SH X832H

## (undated) DEVICE — TOWEL,OR,DSP,ST,NATURAL,DLX,4/PK,20PK/CS: Brand: MEDLINE

## (undated) DEVICE — LEGGINGS, PAIR, 31X48, STERILE: Brand: MEDLINE

## (undated) DEVICE — TUBE FEED GASTROSTOMY MIC 22FR

## (undated) DEVICE — GLOVE SURG SZ 65 THK91MIL LTX FREE SYN POLYISOPRENE

## (undated) DEVICE — GAUZE,SPONGE,FLUFF,6"X6.75",STRL,5/TRAY: Brand: MEDLINE

## (undated) DEVICE — SUTURE ETHLN SZ 3-0 L18IN NONABSORBABLE BLK L24MM PS-1 3/8 1663G